# Patient Record
Sex: FEMALE | Race: BLACK OR AFRICAN AMERICAN | NOT HISPANIC OR LATINO
[De-identification: names, ages, dates, MRNs, and addresses within clinical notes are randomized per-mention and may not be internally consistent; named-entity substitution may affect disease eponyms.]

---

## 2022-11-11 ENCOUNTER — NON-APPOINTMENT (OUTPATIENT)
Age: 42
End: 2022-11-11

## 2022-11-11 ENCOUNTER — LABORATORY RESULT (OUTPATIENT)
Age: 42
End: 2022-11-11

## 2022-11-11 ENCOUNTER — APPOINTMENT (OUTPATIENT)
Dept: OBGYN | Facility: CLINIC | Age: 42
End: 2022-11-11

## 2022-11-11 VITALS
HEART RATE: 65 BPM | DIASTOLIC BLOOD PRESSURE: 81 MMHG | WEIGHT: 166 LBS | HEIGHT: 68 IN | OXYGEN SATURATION: 100 % | SYSTOLIC BLOOD PRESSURE: 122 MMHG | BODY MASS INDEX: 25.16 KG/M2

## 2022-11-11 DIAGNOSIS — Z34.90 ENCOUNTER FOR SUPERVISION OF NORMAL PREGNANCY, UNSPECIFIED, UNSPECIFIED TRIMESTER: ICD-10-CM

## 2022-11-11 DIAGNOSIS — Z86.018 PERSONAL HISTORY OF OTHER BENIGN NEOPLASM: ICD-10-CM

## 2022-11-11 DIAGNOSIS — O09.819 SUPERVISION OF PREGNANCY RESULTING FROM ASSISTED REPRODUCTIVE TECHNOLOGY, UNSPECIFIED TRIMESTER: ICD-10-CM

## 2022-11-11 DIAGNOSIS — D56.3 THALASSEMIA MINOR: ICD-10-CM

## 2022-11-11 PROBLEM — Z00.00 ENCOUNTER FOR PREVENTIVE HEALTH EXAMINATION: Status: ACTIVE | Noted: 2022-11-11

## 2022-11-11 PROCEDURE — 36415 COLL VENOUS BLD VENIPUNCTURE: CPT

## 2022-11-11 PROCEDURE — 76817 TRANSVAGINAL US OBSTETRIC: CPT

## 2022-11-11 PROCEDURE — 99203 OFFICE O/P NEW LOW 30 MIN: CPT | Mod: 25

## 2022-11-11 NOTE — PROCEDURE
[Cervical Pap Smear] : cervical Pap smear [Liquid Base] : liquid base [GC Chlamydia Culture] : GC Chlamydia Culture [Affirm (Triple Culture)] : Affirm (triple culture) [Tolerated Well] : the patient tolerated the procedure well [No Complications] : there were no complications [Transvaginal OB Sonogram] : Transvaginal OB Sonogram [Intrauterine Pregnancy] : intrauterine pregnancy [Yolk Sac] : yolk sac present [Fetal Heart] : fetal heart present [Current GA by Sonogram: ___ (wks)] : Current GA by Sonogram: [unfilled]Uwks [___ day(s)] : [unfilled] days [Transvaginal OB Sonogram WNL] : Transvaginal OB Sonogram WNL

## 2022-11-11 NOTE — PLAN
[FreeTextEntry1] : CRL c/w PARAG. Initial prenatal packet given and pt oriented to the practice. NT referral given. Continue estrogen/progesterone until 12 weeks. Start  mg at 12-16 weeks. Follow up in 4 weeks.\par

## 2022-11-11 NOTE — HISTORY OF PRESENT ILLNESS
[Normal Amount/Duration] :  normal amount and duration [Frequency: Q ___ days] : menstrual periods occur approximately every [unfilled] days [Currently Active] : currently active [Men] : men [No] : No [FreeTextEntry1] : 09/07/2022

## 2022-11-12 LAB
ABO + RH PNL BLD: NORMAL
ALBUMIN SERPL ELPH-MCNC: 4.4 G/DL
ALP BLD-CCNC: 44 U/L
ALT SERPL-CCNC: 8 U/L
ANION GAP SERPL CALC-SCNC: 15 MMOL/L
AST SERPL-CCNC: 14 U/L
BASOPHILS # BLD AUTO: 0.01 K/UL
BASOPHILS NFR BLD AUTO: 0.2 %
BILIRUB SERPL-MCNC: 0.4 MG/DL
BLD GP AB SCN SERPL QL: NORMAL
BUN SERPL-MCNC: 7 MG/DL
CALCIUM SERPL-MCNC: 9.4 MG/DL
CHLORIDE SERPL-SCNC: 104 MMOL/L
CMV IGG SERPL QL: 5 U/ML
CMV IGG SERPL-IMP: POSITIVE
CMV IGM SERPL QL: <8 AU/ML
CMV IGM SERPL QL: NEGATIVE
CO2 SERPL-SCNC: 16 MMOL/L
CREAT SERPL-MCNC: 0.68 MG/DL
EGFR: 114 ML/MIN/1.73M2
EOSINOPHIL # BLD AUTO: 0.17 K/UL
EOSINOPHIL NFR BLD AUTO: 3.6 %
GLUCOSE SERPL-MCNC: 93 MG/DL
HBV SURFACE AG SER QL: NONREACTIVE
HCT VFR BLD CALC: 38.2 %
HCV AB SER QL: NONREACTIVE
HCV S/CO RATIO: 0.09 S/CO
HGB BLD-MCNC: 12.4 G/DL
HIV1+2 AB SPEC QL IA.RAPID: NONREACTIVE
IMM GRANULOCYTES NFR BLD AUTO: 0.4 %
LDH SERPL-CCNC: 157 U/L
LYMPHOCYTES # BLD AUTO: 1.3 K/UL
LYMPHOCYTES NFR BLD AUTO: 27.6 %
MAN DIFF?: NORMAL
MCHC RBC-ENTMCNC: 27.9 PG
MCHC RBC-ENTMCNC: 32.5 GM/DL
MCV RBC AUTO: 85.8 FL
MONOCYTES # BLD AUTO: 0.36 K/UL
MONOCYTES NFR BLD AUTO: 7.6 %
MUV AB SER-ACNC: POSITIVE
MUV IGG SER QL IA: >300 AU/ML
NEUTROPHILS # BLD AUTO: 2.85 K/UL
NEUTROPHILS NFR BLD AUTO: 60.6 %
PLATELET # BLD AUTO: 203 K/UL
POTASSIUM SERPL-SCNC: 4.1 MMOL/L
PROT SERPL-MCNC: 7.2 G/DL
RBC # BLD: 4.45 M/UL
RBC # FLD: 15 %
RUBV IGG FLD-ACNC: 18.1 INDEX
RUBV IGG SER-IMP: POSITIVE
SODIUM SERPL-SCNC: 134 MMOL/L
T GONDII AB SER-IMP: NEGATIVE
T GONDII AB SER-IMP: POSITIVE
T GONDII IGG SER QL: 40.1 IU/ML
T GONDII IGM SER QL: <3 AU/ML
T PALLIDUM AB SER QL IA: NEGATIVE
TSH SERPL-ACNC: 0.21 UIU/ML
URATE SERPL-MCNC: 3.6 MG/DL
VZV AB TITR SER: POSITIVE
VZV IGG SER IF-ACNC: 499.7 INDEX
WBC # FLD AUTO: 4.71 K/UL

## 2022-11-14 DIAGNOSIS — O23.599 INFECTION OF OTHER PART OF GENITAL TRACT IN PREGNANCY, UNSPECIFIED TRIMESTER: ICD-10-CM

## 2022-11-14 DIAGNOSIS — B96.89 INFECTION OF OTHER PART OF GENITAL TRACT IN PREGNANCY, UNSPECIFIED TRIMESTER: ICD-10-CM

## 2022-11-14 LAB
C TRACH RRNA SPEC QL NAA+PROBE: NOT DETECTED
CANDIDA VAG CYTO: NOT DETECTED
G VAGINALIS+PREV SP MTYP VAG QL MICRO: DETECTED
HGB A MFR BLD: 96.9 %
HGB A2 MFR BLD: 2.5 %
HGB F MFR BLD: 0.6 %
HGB FRACT BLD-IMP: NORMAL
HPV HIGH+LOW RISK DNA PNL CVX: NOT DETECTED
N GONORRHOEA RRNA SPEC QL NAA+PROBE: NOT DETECTED
SOURCE AMPLIFICATION: NORMAL
T VAGINALIS VAG QL WET PREP: NOT DETECTED

## 2022-11-14 RX ORDER — METRONIDAZOLE 7.5 MG/G
0.75 GEL VAGINAL
Qty: 1 | Refills: 0 | Status: ACTIVE | COMMUNITY
Start: 2022-11-14 | End: 1900-01-01

## 2022-11-15 LAB
FMR1 GENE MUT ANL BLD/T: NORMAL
MEV IGM SER QL: <0.91 ISR

## 2022-11-16 ENCOUNTER — TRANSCRIPTION ENCOUNTER (OUTPATIENT)
Age: 42
End: 2022-11-16

## 2022-11-19 LAB
AR GENE MUT ANL BLD/T: NORMAL
B19V IGG SER QL IA: 0.84 INDEX
B19V IGG+IGM SER-IMP: NEGATIVE
B19V IGG+IGM SER-IMP: NORMAL
B19V IGM FLD-ACNC: 0.07 INDEX
B19V IGM SER-ACNC: NEGATIVE
CYTOLOGY CVX/VAG DOC THIN PREP: NORMAL

## 2022-11-20 LAB — CFTR MUT TESTED BLD/T: NEGATIVE

## 2022-12-07 ENCOUNTER — APPOINTMENT (OUTPATIENT)
Dept: ANTEPARTUM | Facility: CLINIC | Age: 42
End: 2022-12-07

## 2022-12-07 ENCOUNTER — ASOB RESULT (OUTPATIENT)
Age: 42
End: 2022-12-07

## 2022-12-07 PROCEDURE — 76813 OB US NUCHAL MEAS 1 GEST: CPT

## 2022-12-07 PROCEDURE — 93976 VASCULAR STUDY: CPT

## 2022-12-07 PROCEDURE — 36415 COLL VENOUS BLD VENIPUNCTURE: CPT

## 2022-12-09 ENCOUNTER — NON-APPOINTMENT (OUTPATIENT)
Age: 42
End: 2022-12-09

## 2022-12-09 ENCOUNTER — APPOINTMENT (OUTPATIENT)
Dept: OBGYN | Facility: CLINIC | Age: 42
End: 2022-12-09

## 2022-12-09 VITALS
WEIGHT: 165 LBS | BODY MASS INDEX: 25.09 KG/M2 | HEART RATE: 61 BPM | OXYGEN SATURATION: 99 % | DIASTOLIC BLOOD PRESSURE: 72 MMHG | SYSTOLIC BLOOD PRESSURE: 116 MMHG

## 2022-12-09 PROCEDURE — 76815 OB US LIMITED FETUS(S): CPT

## 2022-12-09 PROCEDURE — 0500F INITIAL PRENATAL CARE VISIT: CPT

## 2022-12-09 PROCEDURE — 36415 COLL VENOUS BLD VENIPUNCTURE: CPT

## 2022-12-30 ENCOUNTER — APPOINTMENT (OUTPATIENT)
Dept: ANTEPARTUM | Facility: CLINIC | Age: 42
End: 2022-12-30
Payer: COMMERCIAL

## 2022-12-30 ENCOUNTER — ASOB RESULT (OUTPATIENT)
Age: 42
End: 2022-12-30

## 2022-12-30 PROCEDURE — 76817 TRANSVAGINAL US OBSTETRIC: CPT | Mod: 59

## 2022-12-30 PROCEDURE — 76811 OB US DETAILED SNGL FETUS: CPT

## 2023-01-06 ENCOUNTER — NON-APPOINTMENT (OUTPATIENT)
Age: 43
End: 2023-01-06

## 2023-01-06 ENCOUNTER — APPOINTMENT (OUTPATIENT)
Dept: OBGYN | Facility: CLINIC | Age: 43
End: 2023-01-06

## 2023-02-06 ENCOUNTER — APPOINTMENT (OUTPATIENT)
Dept: ANTEPARTUM | Facility: CLINIC | Age: 43
End: 2023-02-06
Payer: COMMERCIAL

## 2023-02-06 ENCOUNTER — ASOB RESULT (OUTPATIENT)
Age: 43
End: 2023-02-06

## 2023-02-06 PROCEDURE — 76816 OB US FOLLOW-UP PER FETUS: CPT

## 2023-02-06 PROCEDURE — 76817 TRANSVAGINAL US OBSTETRIC: CPT | Mod: 59

## 2023-03-09 ENCOUNTER — NON-APPOINTMENT (OUTPATIENT)
Age: 43
End: 2023-03-09

## 2023-04-07 ENCOUNTER — ASOB RESULT (OUTPATIENT)
Age: 43
End: 2023-04-07

## 2023-04-07 ENCOUNTER — APPOINTMENT (OUTPATIENT)
Dept: ANTEPARTUM | Facility: CLINIC | Age: 43
End: 2023-04-07
Payer: COMMERCIAL

## 2023-04-07 PROCEDURE — 76816 OB US FOLLOW-UP PER FETUS: CPT

## 2023-04-07 PROCEDURE — 76819 FETAL BIOPHYS PROFIL W/O NST: CPT

## 2023-05-05 ENCOUNTER — ASOB RESULT (OUTPATIENT)
Age: 43
End: 2023-05-05

## 2023-05-05 ENCOUNTER — APPOINTMENT (OUTPATIENT)
Dept: ANTEPARTUM | Facility: CLINIC | Age: 43
End: 2023-05-05
Payer: COMMERCIAL

## 2023-05-05 PROCEDURE — 76819 FETAL BIOPHYS PROFIL W/O NST: CPT

## 2023-05-05 PROCEDURE — 76816 OB US FOLLOW-UP PER FETUS: CPT

## 2023-05-26 ENCOUNTER — APPOINTMENT (OUTPATIENT)
Dept: ANTEPARTUM | Facility: CLINIC | Age: 43
End: 2023-05-26
Payer: COMMERCIAL

## 2023-05-26 ENCOUNTER — ASOB RESULT (OUTPATIENT)
Age: 43
End: 2023-05-26

## 2023-05-26 PROCEDURE — 76816 OB US FOLLOW-UP PER FETUS: CPT

## 2023-05-26 PROCEDURE — 76819 FETAL BIOPHYS PROFIL W/O NST: CPT

## 2023-06-06 ENCOUNTER — TRANSCRIPTION ENCOUNTER (OUTPATIENT)
Age: 43
End: 2023-06-06

## 2023-06-07 ENCOUNTER — INPATIENT (INPATIENT)
Facility: HOSPITAL | Age: 43
LOS: 2 days | Discharge: HOME | End: 2023-06-10
Attending: OBSTETRICS & GYNECOLOGY | Admitting: OBSTETRICS & GYNECOLOGY
Payer: COMMERCIAL

## 2023-06-07 VITALS — HEIGHT: 69 IN | WEIGHT: 184.09 LBS

## 2023-06-07 DIAGNOSIS — D62 ACUTE POSTHEMORRHAGIC ANEMIA: ICD-10-CM

## 2023-06-07 LAB
BASOPHILS # BLD AUTO: 0.01 K/UL — SIGNIFICANT CHANGE UP (ref 0–0.2)
BASOPHILS NFR BLD AUTO: 0.2 % — SIGNIFICANT CHANGE UP (ref 0–2)
BLD GP AB SCN SERPL QL: NEGATIVE — SIGNIFICANT CHANGE UP
EOSINOPHIL # BLD AUTO: 0.03 K/UL — SIGNIFICANT CHANGE UP (ref 0–0.5)
EOSINOPHIL NFR BLD AUTO: 0.5 % — SIGNIFICANT CHANGE UP (ref 0–6)
HCT VFR BLD CALC: 41.1 % — SIGNIFICANT CHANGE UP (ref 34.5–45)
HGB BLD-MCNC: 13.3 G/DL — SIGNIFICANT CHANGE UP (ref 11.5–15.5)
IMM GRANULOCYTES NFR BLD AUTO: 0.9 % — SIGNIFICANT CHANGE UP (ref 0–0.9)
LYMPHOCYTES # BLD AUTO: 1.46 K/UL — SIGNIFICANT CHANGE UP (ref 1–3.3)
LYMPHOCYTES # BLD AUTO: 26.1 % — SIGNIFICANT CHANGE UP (ref 13–44)
MCHC RBC-ENTMCNC: 27.4 PG — SIGNIFICANT CHANGE UP (ref 27–34)
MCHC RBC-ENTMCNC: 32.4 GM/DL — SIGNIFICANT CHANGE UP (ref 32–36)
MCV RBC AUTO: 84.6 FL — SIGNIFICANT CHANGE UP (ref 80–100)
MONOCYTES # BLD AUTO: 0.45 K/UL — SIGNIFICANT CHANGE UP (ref 0–0.9)
MONOCYTES NFR BLD AUTO: 8 % — SIGNIFICANT CHANGE UP (ref 2–14)
NEUTROPHILS # BLD AUTO: 3.6 K/UL — SIGNIFICANT CHANGE UP (ref 1.8–7.4)
NEUTROPHILS NFR BLD AUTO: 64.3 % — SIGNIFICANT CHANGE UP (ref 43–77)
NRBC # BLD: 0 /100 WBCS — SIGNIFICANT CHANGE UP (ref 0–0)
PLATELET # BLD AUTO: 174 K/UL — SIGNIFICANT CHANGE UP (ref 150–400)
RBC # BLD: 4.86 M/UL — SIGNIFICANT CHANGE UP (ref 3.8–5.2)
RBC # FLD: 16.5 % — HIGH (ref 10.3–14.5)
RH IG SCN BLD-IMP: POSITIVE — SIGNIFICANT CHANGE UP
WBC # BLD: 5.6 K/UL — SIGNIFICANT CHANGE UP (ref 3.8–10.5)
WBC # FLD AUTO: 5.6 K/UL — SIGNIFICANT CHANGE UP (ref 3.8–10.5)

## 2023-06-07 PROCEDURE — 88307 TISSUE EXAM BY PATHOLOGIST: CPT | Mod: 26

## 2023-06-07 DEVICE — INTERCEED 3 X 4": Type: IMPLANTABLE DEVICE | Status: FUNCTIONAL

## 2023-06-07 DEVICE — ARISTA 3GR: Type: IMPLANTABLE DEVICE | Status: FUNCTIONAL

## 2023-06-07 RX ORDER — NALOXONE HYDROCHLORIDE 4 MG/.1ML
0.1 SPRAY NASAL
Refills: 0 | Status: DISCONTINUED | OUTPATIENT
Start: 2023-06-07 | End: 2023-06-10

## 2023-06-07 RX ORDER — OXYTOCIN 10 UNIT/ML
333.33 VIAL (ML) INJECTION
Qty: 20 | Refills: 0 | Status: DISCONTINUED | OUTPATIENT
Start: 2023-06-07 | End: 2023-06-10

## 2023-06-07 RX ORDER — ONDANSETRON 8 MG/1
4 TABLET, FILM COATED ORAL EVERY 6 HOURS
Refills: 0 | Status: DISCONTINUED | OUTPATIENT
Start: 2023-06-07 | End: 2023-06-10

## 2023-06-07 RX ORDER — KETOROLAC TROMETHAMINE 30 MG/ML
30 SYRINGE (ML) INJECTION EVERY 6 HOURS
Refills: 0 | Status: DISCONTINUED | OUTPATIENT
Start: 2023-06-07 | End: 2023-06-08

## 2023-06-07 RX ORDER — CITRIC ACID/SODIUM CITRATE 300-500 MG
30 SOLUTION, ORAL ORAL ONCE
Refills: 0 | Status: COMPLETED | OUTPATIENT
Start: 2023-06-07 | End: 2023-06-07

## 2023-06-07 RX ORDER — BUPIVACAINE 13.3 MG/ML
20 INJECTION, SUSPENSION, LIPOSOMAL INFILTRATION ONCE
Refills: 0 | Status: COMPLETED | OUTPATIENT
Start: 2023-06-07 | End: 2023-06-07

## 2023-06-07 RX ORDER — OXYTOCIN 10 UNIT/ML
333.33 VIAL (ML) INJECTION
Qty: 20 | Refills: 0 | Status: DISCONTINUED | OUTPATIENT
Start: 2023-06-07 | End: 2023-06-07

## 2023-06-07 RX ORDER — IBUPROFEN 200 MG
600 TABLET ORAL EVERY 6 HOURS
Refills: 0 | Status: COMPLETED | OUTPATIENT
Start: 2023-06-07 | End: 2024-05-05

## 2023-06-07 RX ORDER — TETANUS TOXOID, REDUCED DIPHTHERIA TOXOID AND ACELLULAR PERTUSSIS VACCINE, ADSORBED 5; 2.5; 8; 8; 2.5 [IU]/.5ML; [IU]/.5ML; UG/.5ML; UG/.5ML; UG/.5ML
0.5 SUSPENSION INTRAMUSCULAR ONCE
Refills: 0 | Status: DISCONTINUED | OUTPATIENT
Start: 2023-06-07 | End: 2023-06-10

## 2023-06-07 RX ORDER — MAGNESIUM HYDROXIDE 400 MG/1
30 TABLET, CHEWABLE ORAL
Refills: 0 | Status: DISCONTINUED | OUTPATIENT
Start: 2023-06-07 | End: 2023-06-10

## 2023-06-07 RX ORDER — SODIUM CHLORIDE 9 MG/ML
1000 INJECTION, SOLUTION INTRAVENOUS ONCE
Refills: 0 | Status: COMPLETED | OUTPATIENT
Start: 2023-06-07 | End: 2023-06-07

## 2023-06-07 RX ORDER — SODIUM CHLORIDE 9 MG/ML
1000 INJECTION, SOLUTION INTRAVENOUS
Refills: 0 | Status: DISCONTINUED | OUTPATIENT
Start: 2023-06-07 | End: 2023-06-07

## 2023-06-07 RX ORDER — ENOXAPARIN SODIUM 100 MG/ML
40 INJECTION SUBCUTANEOUS EVERY 24 HOURS
Refills: 0 | Status: DISCONTINUED | OUTPATIENT
Start: 2023-06-08 | End: 2023-06-10

## 2023-06-07 RX ORDER — LANOLIN
1 OINTMENT (GRAM) TOPICAL EVERY 6 HOURS
Refills: 0 | Status: DISCONTINUED | OUTPATIENT
Start: 2023-06-07 | End: 2023-06-10

## 2023-06-07 RX ORDER — FAMOTIDINE 10 MG/ML
20 INJECTION INTRAVENOUS ONCE
Refills: 0 | Status: COMPLETED | OUTPATIENT
Start: 2023-06-07 | End: 2023-06-07

## 2023-06-07 RX ORDER — SIMETHICONE 80 MG/1
80 TABLET, CHEWABLE ORAL EVERY 4 HOURS
Refills: 0 | Status: DISCONTINUED | OUTPATIENT
Start: 2023-06-07 | End: 2023-06-10

## 2023-06-07 RX ORDER — OXYCODONE HYDROCHLORIDE 5 MG/1
5 TABLET ORAL ONCE
Refills: 0 | Status: DISCONTINUED | OUTPATIENT
Start: 2023-06-07 | End: 2023-06-10

## 2023-06-07 RX ORDER — CEFAZOLIN SODIUM 1 G
2000 VIAL (EA) INJECTION ONCE
Refills: 0 | Status: COMPLETED | OUTPATIENT
Start: 2023-06-07 | End: 2023-06-07

## 2023-06-07 RX ORDER — ACETAMINOPHEN 500 MG
1000 TABLET ORAL ONCE
Refills: 0 | Status: COMPLETED | OUTPATIENT
Start: 2023-06-07 | End: 2023-06-07

## 2023-06-07 RX ORDER — ACETAMINOPHEN 500 MG
975 TABLET ORAL
Refills: 0 | Status: DISCONTINUED | OUTPATIENT
Start: 2023-06-07 | End: 2023-06-10

## 2023-06-07 RX ORDER — OXYCODONE HYDROCHLORIDE 5 MG/1
5 TABLET ORAL
Refills: 0 | Status: COMPLETED | OUTPATIENT
Start: 2023-06-07 | End: 2023-06-14

## 2023-06-07 RX ORDER — SODIUM CHLORIDE 9 MG/ML
1000 INJECTION, SOLUTION INTRAVENOUS
Refills: 0 | Status: DISCONTINUED | OUTPATIENT
Start: 2023-06-07 | End: 2023-06-10

## 2023-06-07 RX ORDER — TRIAMCINOLONE 4 MG
40 TABLET ORAL ONCE
Refills: 0 | Status: COMPLETED | OUTPATIENT
Start: 2023-06-07 | End: 2023-06-07

## 2023-06-07 RX ORDER — DIPHENHYDRAMINE HCL 50 MG
25 CAPSULE ORAL EVERY 6 HOURS
Refills: 0 | Status: DISCONTINUED | OUTPATIENT
Start: 2023-06-07 | End: 2023-06-10

## 2023-06-07 RX ADMIN — Medication 1000 MILLIGRAM(S): at 12:50

## 2023-06-07 RX ADMIN — Medication 30 MILLIGRAM(S): at 13:51

## 2023-06-07 RX ADMIN — BUPIVACAINE 20 MILLILITER(S): 13.3 INJECTION, SUSPENSION, LIPOSOMAL INFILTRATION at 11:05

## 2023-06-07 RX ADMIN — Medication 975 MILLIGRAM(S): at 18:55

## 2023-06-07 RX ADMIN — SODIUM CHLORIDE 2000 MILLILITER(S): 9 INJECTION, SOLUTION INTRAVENOUS at 07:25

## 2023-06-07 RX ADMIN — Medication 40 MILLIGRAM(S): at 11:30

## 2023-06-07 RX ADMIN — Medication 30 MILLIGRAM(S): at 14:23

## 2023-06-07 RX ADMIN — Medication 1 APPLICATION(S): at 18:55

## 2023-06-07 RX ADMIN — Medication 100 MILLIGRAM(S): at 09:45

## 2023-06-07 RX ADMIN — Medication 975 MILLIGRAM(S): at 19:25

## 2023-06-07 RX ADMIN — Medication 30 MILLILITER(S): at 09:29

## 2023-06-07 RX ADMIN — Medication 1000 MILLIUNIT(S)/MIN: at 12:17

## 2023-06-07 RX ADMIN — FAMOTIDINE 20 MILLIGRAM(S): 10 INJECTION INTRAVENOUS at 08:00

## 2023-06-07 RX ADMIN — Medication 30 MILLIGRAM(S): at 21:04

## 2023-06-07 RX ADMIN — Medication 400 MILLIGRAM(S): at 12:31

## 2023-06-07 NOTE — PRE-ANESTHESIA EVALUATION ADULT - BMI (KG/M2)
Genevive Larger, your thyroid levels are normal. We will plan to repeat levels in 6 months, an order has been placed. You are due for a thyroid ultrasound and follow up. Please call the office to schedule a follow up appointment. 27.2

## 2023-06-08 LAB
BASOPHILS # BLD AUTO: 0.01 K/UL — SIGNIFICANT CHANGE UP (ref 0–0.2)
BASOPHILS NFR BLD AUTO: 0.1 % — SIGNIFICANT CHANGE UP (ref 0–2)
EOSINOPHIL # BLD AUTO: 0 K/UL — SIGNIFICANT CHANGE UP (ref 0–0.5)
EOSINOPHIL NFR BLD AUTO: 0 % — SIGNIFICANT CHANGE UP (ref 0–6)
HCT VFR BLD CALC: 30.1 % — LOW (ref 34.5–45)
HGB BLD-MCNC: 9.7 G/DL — LOW (ref 11.5–15.5)
IMM GRANULOCYTES NFR BLD AUTO: 0.6 % — SIGNIFICANT CHANGE UP (ref 0–0.9)
LYMPHOCYTES # BLD AUTO: 1.4 K/UL — SIGNIFICANT CHANGE UP (ref 1–3.3)
LYMPHOCYTES # BLD AUTO: 15 % — SIGNIFICANT CHANGE UP (ref 13–44)
MCHC RBC-ENTMCNC: 27.4 PG — SIGNIFICANT CHANGE UP (ref 27–34)
MCHC RBC-ENTMCNC: 32.2 GM/DL — SIGNIFICANT CHANGE UP (ref 32–36)
MCV RBC AUTO: 85 FL — SIGNIFICANT CHANGE UP (ref 80–100)
MONOCYTES # BLD AUTO: 0.79 K/UL — SIGNIFICANT CHANGE UP (ref 0–0.9)
MONOCYTES NFR BLD AUTO: 8.5 % — SIGNIFICANT CHANGE UP (ref 2–14)
NEUTROPHILS # BLD AUTO: 7.05 K/UL — SIGNIFICANT CHANGE UP (ref 1.8–7.4)
NEUTROPHILS NFR BLD AUTO: 75.8 % — SIGNIFICANT CHANGE UP (ref 43–77)
NRBC # BLD: 0 /100 WBCS — SIGNIFICANT CHANGE UP (ref 0–0)
PLATELET # BLD AUTO: 152 K/UL — SIGNIFICANT CHANGE UP (ref 150–400)
RBC # BLD: 3.54 M/UL — LOW (ref 3.8–5.2)
RBC # FLD: 16 % — HIGH (ref 10.3–14.5)
T PALLIDUM AB TITR SER: NEGATIVE — SIGNIFICANT CHANGE UP
WBC # BLD: 9.31 K/UL — SIGNIFICANT CHANGE UP (ref 3.8–10.5)
WBC # FLD AUTO: 9.31 K/UL — SIGNIFICANT CHANGE UP (ref 3.8–10.5)

## 2023-06-08 PROCEDURE — 59514 CESAREAN DELIVERY ONLY: CPT | Mod: AS

## 2023-06-08 RX ORDER — IBUPROFEN 200 MG
600 TABLET ORAL EVERY 6 HOURS
Refills: 0 | Status: DISCONTINUED | OUTPATIENT
Start: 2023-06-08 | End: 2023-06-10

## 2023-06-08 RX ORDER — OXYCODONE HYDROCHLORIDE 5 MG/1
5 TABLET ORAL
Refills: 0 | Status: DISCONTINUED | OUTPATIENT
Start: 2023-06-08 | End: 2023-06-10

## 2023-06-08 RX ORDER — OXYCODONE HYDROCHLORIDE 5 MG/1
1 TABLET ORAL
Qty: 12 | Refills: 0
Start: 2023-06-08

## 2023-06-08 RX ADMIN — Medication 600 MILLIGRAM(S): at 22:15

## 2023-06-08 RX ADMIN — SIMETHICONE 80 MILLIGRAM(S): 80 TABLET, CHEWABLE ORAL at 08:38

## 2023-06-08 RX ADMIN — Medication 600 MILLIGRAM(S): at 21:27

## 2023-06-08 RX ADMIN — OXYCODONE HYDROCHLORIDE 5 MILLIGRAM(S): 5 TABLET ORAL at 20:11

## 2023-06-08 RX ADMIN — SIMETHICONE 80 MILLIGRAM(S): 80 TABLET, CHEWABLE ORAL at 18:55

## 2023-06-08 RX ADMIN — Medication 600 MILLIGRAM(S): at 14:58

## 2023-06-08 RX ADMIN — Medication 30 MILLIGRAM(S): at 08:37

## 2023-06-08 RX ADMIN — Medication 975 MILLIGRAM(S): at 00:08

## 2023-06-08 RX ADMIN — Medication 30 MILLIGRAM(S): at 02:07

## 2023-06-08 RX ADMIN — ENOXAPARIN SODIUM 40 MILLIGRAM(S): 100 INJECTION SUBCUTANEOUS at 09:19

## 2023-06-08 RX ADMIN — Medication 975 MILLIGRAM(S): at 17:02

## 2023-06-08 RX ADMIN — OXYCODONE HYDROCHLORIDE 5 MILLIGRAM(S): 5 TABLET ORAL at 19:33

## 2023-06-08 RX ADMIN — Medication 975 MILLIGRAM(S): at 12:15

## 2023-06-08 RX ADMIN — Medication 975 MILLIGRAM(S): at 05:43

## 2023-06-08 NOTE — PROVIDER CONTACT NOTE (OTHER) - ACTION/TREATMENT ORDERED:
MD stated no further interventions are indicated at this time, just to notify her if pt passes another clot. Care ongoing. Pt verbalized understanding to notify RN if another blood clot is passed.

## 2023-06-08 NOTE — PROVIDER CONTACT NOTE (OTHER) - ASSESSMENT
Pt asymptomatic. fundus firm, no trickling. Pt asymptomatic. fundus firm, no trickling. Pt ambulating freely.

## 2023-06-08 NOTE — PROVIDER CONTACT NOTE (OTHER) - BACKGROUND
42 yr old female, Post op day 1. Delivered via c/s  1030,  @ 39. EBL 1000. Removed sanchez catheter  @ 4355.

## 2023-06-09 RX ORDER — OXYCODONE HYDROCHLORIDE 5 MG/1
1 TABLET ORAL
Qty: 12 | Refills: 0
Start: 2023-06-09

## 2023-06-09 RX ORDER — IRON SUCROSE 20 MG/ML
200 INJECTION, SOLUTION INTRAVENOUS ONCE
Refills: 0 | Status: COMPLETED | OUTPATIENT
Start: 2023-06-09 | End: 2023-06-09

## 2023-06-09 RX ORDER — ACETAMINOPHEN 500 MG
2 TABLET ORAL
Qty: 60 | Refills: 0
Start: 2023-06-09

## 2023-06-09 RX ORDER — IBUPROFEN 200 MG
1 TABLET ORAL
Qty: 60 | Refills: 0
Start: 2023-06-09

## 2023-06-09 RX ADMIN — Medication 975 MILLIGRAM(S): at 08:33

## 2023-06-09 RX ADMIN — ENOXAPARIN SODIUM 40 MILLIGRAM(S): 100 INJECTION SUBCUTANEOUS at 11:18

## 2023-06-09 RX ADMIN — Medication 975 MILLIGRAM(S): at 00:57

## 2023-06-09 RX ADMIN — IRON SUCROSE 110 MILLIGRAM(S): 20 INJECTION, SOLUTION INTRAVENOUS at 11:19

## 2023-06-09 RX ADMIN — Medication 600 MILLIGRAM(S): at 06:30

## 2023-06-09 RX ADMIN — Medication 600 MILLIGRAM(S): at 11:18

## 2023-06-09 RX ADMIN — Medication 975 MILLIGRAM(S): at 14:08

## 2023-06-09 RX ADMIN — SIMETHICONE 80 MILLIGRAM(S): 80 TABLET, CHEWABLE ORAL at 05:44

## 2023-06-09 RX ADMIN — Medication 600 MILLIGRAM(S): at 05:45

## 2023-06-09 RX ADMIN — Medication 975 MILLIGRAM(S): at 01:45

## 2023-06-09 RX ADMIN — SIMETHICONE 80 MILLIGRAM(S): 80 TABLET, CHEWABLE ORAL at 14:09

## 2023-06-09 RX ADMIN — Medication 975 MILLIGRAM(S): at 21:33

## 2023-06-09 RX ADMIN — Medication 600 MILLIGRAM(S): at 17:13

## 2023-06-10 ENCOUNTER — TRANSCRIPTION ENCOUNTER (OUTPATIENT)
Age: 43
End: 2023-06-10

## 2023-06-10 VITALS
HEART RATE: 67 BPM | DIASTOLIC BLOOD PRESSURE: 77 MMHG | TEMPERATURE: 98 F | OXYGEN SATURATION: 99 % | SYSTOLIC BLOOD PRESSURE: 119 MMHG | RESPIRATION RATE: 17 BRPM

## 2023-06-10 PROCEDURE — 86900 BLOOD TYPING SEROLOGIC ABO: CPT

## 2023-06-10 PROCEDURE — 86850 RBC ANTIBODY SCREEN: CPT

## 2023-06-10 PROCEDURE — 59050 FETAL MONITOR W/REPORT: CPT

## 2023-06-10 PROCEDURE — 86901 BLOOD TYPING SEROLOGIC RH(D): CPT

## 2023-06-10 PROCEDURE — 88307 TISSUE EXAM BY PATHOLOGIST: CPT

## 2023-06-10 PROCEDURE — 36415 COLL VENOUS BLD VENIPUNCTURE: CPT

## 2023-06-10 PROCEDURE — 86780 TREPONEMA PALLIDUM: CPT

## 2023-06-10 PROCEDURE — C1889: CPT

## 2023-06-10 PROCEDURE — C1765: CPT

## 2023-06-10 PROCEDURE — 85025 COMPLETE CBC W/AUTO DIFF WBC: CPT

## 2023-06-10 RX ORDER — OXYCODONE HYDROCHLORIDE 5 MG/1
1 TABLET ORAL
Qty: 5 | Refills: 0
Start: 2023-06-10

## 2023-06-10 RX ORDER — ACETAMINOPHEN 500 MG
3 TABLET ORAL
Qty: 0 | Refills: 0 | DISCHARGE
Start: 2023-06-10

## 2023-06-10 RX ORDER — IBUPROFEN 200 MG
1 TABLET ORAL
Qty: 0 | Refills: 0 | DISCHARGE
Start: 2023-06-10

## 2023-06-10 RX ADMIN — Medication 975 MILLIGRAM(S): at 03:04

## 2023-06-10 RX ADMIN — Medication 600 MILLIGRAM(S): at 06:35

## 2023-06-10 RX ADMIN — Medication 975 MILLIGRAM(S): at 08:55

## 2023-06-10 RX ADMIN — Medication 600 MILLIGRAM(S): at 00:18

## 2023-06-10 RX ADMIN — Medication 600 MILLIGRAM(S): at 11:23

## 2023-06-10 NOTE — DISCHARGE NOTE OB - PATIENT PORTAL LINK FT
C/o pain and swelling to left arm. Denies injury. Bruising to left forearm and upper arm. +cms.
You can access the FollowMyHealth Patient Portal offered by [facility] by registering at the following website: [email or web address]. By joining [facility]’s FollowMyHealth portal, you will also be able to view your health information using other applications (apps) compatible with our system.

## 2023-06-10 NOTE — PROGRESS NOTE ADULT - SUBJECTIVE AND OBJECTIVE BOX
Patient evaluated at bedside this morning on POD#1, resting comfortable in bed with no acute events overnight.  She reports pain is well controlled with tylenol and toradol.  She denies headache, dizziness, chest pain, palpitations, shortness of breath, nausea, vomiting, or heavy vaginal bleeding.  She has ambulating, tolerating regular diet, no flatus, sanchez removed, but not yet voided.     Physical Exam:  Vital Signs Last 24 Hrs  T(C): 36.9 (08 Jun 2023 06:10), Max: 37 (07 Jun 2023 22:00)  T(F): 98.5 (08 Jun 2023 06:10), Max: 98.6 (07 Jun 2023 22:00)  HR: 66 (08 Jun 2023 06:10) (62 - 84)  BP: 125/70 (08 Jun 2023 06:10) (98/64 - 135/79)  BP(mean): 92 (07 Jun 2023 13:41) (76 - 101)  RR: 18 (08 Jun 2023 06:10) (15 - 18)  SpO2: 97% (08 Jun 2023 06:10) (97% - 100%)    Parameters below as of 07 Jun 2023 15:15  Patient On (Oxygen Delivery Method): room air        GA: comfortable in NAD  Abd: soft, nontender, nondistended, no rebound or guarding, incision clean, dry and intact, uterus firm at midline, 2fb below umbilicus  : lochia WNL  Extremities: no swelling or calf tenderness, SCDs in place                            13.3   5.60  )-----------( 174      ( 07 Jun 2023 07:31 )             41.1               acetaminophen     Tablet .. 975 milliGRAM(s) Oral <User Schedule>  diphenhydrAMINE 25 milliGRAM(s) Oral every 6 hours PRN  diphtheria/tetanus/pertussis (acellular) Vaccine (Adacel) 0.5 milliLiter(s) IntraMuscular once  enoxaparin Injectable 40 milliGRAM(s) SubCutaneous every 24 hours  ibuprofen  Tablet. 600 milliGRAM(s) Oral every 6 hours  ketorolac   Injectable 30 milliGRAM(s) IV Push every 6 hours  lactated ringers. 1000 milliLiter(s) IV Continuous <Continuous>  lanolin Ointment 1 Application(s) Topical every 6 hours PRN  magnesium hydroxide Suspension 30 milliLiter(s) Oral two times a day PRN  naloxone Injectable 0.1 milliGRAM(s) IV Push every 3 minutes PRN  ondansetron Injectable 4 milliGRAM(s) IV Push every 6 hours PRN  oxyCODONE    IR 5 milliGRAM(s) Oral once PRN  oxyCODONE    IR 5 milliGRAM(s) Oral every 3 hours PRN  oxytocin Infusion 333.333 milliUNIT(s)/Min IV Continuous <Continuous>  simethicone 80 milliGRAM(s) Chew every 4 hours PRN  
Patient evaluated at bedside.   She reports pain is well controlled with OPM.  She has been ambulating without assistance, voiding spontaneously, passing gas, tolerating regular diet and is breastfeeding.  She denies HA, dizziness, CP, palpitations, SOB, n/v, or heavy vaginal bleeding.    Physical Exam:  Vital Signs Last 24 Hrs  T(C): 36.9 (09 Jun 2023 22:00), Max: 37.1 (09 Jun 2023 18:11)  T(F): 98.4 (09 Jun 2023 22:00), Max: 98.8 (09 Jun 2023 18:11)  HR: 75 (09 Jun 2023 22:00) (66 - 75)  BP: 112/79 (09 Jun 2023 22:00) (112/79 - 130/82)  BP(mean): --  RR: 18 (09 Jun 2023 22:00) (17 - 18)  SpO2: 97% (09 Jun 2023 22:00) (97% - 98%)    Parameters below as of 09 Jun 2023 10:01  Patient On (Oxygen Delivery Method): room air        Gen: NAD  Abd: + BS, soft, nontender, nondistended, no rebound or guarding  Incision clean, dry and intact  uterus firm at midline below the umbilicus  : lochia WNL  Extremities: no calf tenderness                          9.7    9.31  )-----------( 152      ( 08 Jun 2023 10:53 )             30.1               
Patient evaluated at bedside this morning on POD#2, resting comfortable in bed with no acute events overnight.  She reports pain is well controlled with tylenol, ibuprofen, and oxycodone.  She denies headache, dizziness, chest pain, palpitations, shortness of breath, nausea, vomiting, or heavy vaginal bleeding.  She has been ambulating, tolerating regular diet, passing flatus, and voiding.     Physical Exam:  Vital Signs Last 24 Hrs  T(C): 36.8 (09 Jun 2023 06:07), Max: 36.9 (08 Jun 2023 22:00)  T(F): 98.2 (09 Jun 2023 06:07), Max: 98.4 (08 Jun 2023 22:00)  HR: 65 (09 Jun 2023 06:07) (65 - 72)  BP: 136/86 (09 Jun 2023 06:07) (114/68 - 136/86)  BP(mean): 89 (08 Jun 2023 18:00) (89 - 89)  RR: 18 (09 Jun 2023 06:07) (17 - 18)  SpO2: 97% (09 Jun 2023 06:07) (97% - 99%)    Parameters below as of 09 Jun 2023 06:07  Patient On (Oxygen Delivery Method): room air        GA: comfortable in NAD  Abd: soft, nontender, nondistended, no rebound or guarding, incision clean, dry and intact, uterus firm at midline, 2 fb below umbilicus  : lochia WNL  Extremities: no swelling or calf tenderness, SCDs in place                            9.7    9.31  )-----------( 152      ( 08 Jun 2023 10:53 )             30.1               acetaminophen     Tablet .. 975 milliGRAM(s) Oral <User Schedule>  diphenhydrAMINE 25 milliGRAM(s) Oral every 6 hours PRN  diphtheria/tetanus/pertussis (acellular) Vaccine (Adacel) 0.5 milliLiter(s) IntraMuscular once  enoxaparin Injectable 40 milliGRAM(s) SubCutaneous every 24 hours  ibuprofen  Tablet. 600 milliGRAM(s) Oral every 6 hours  lactated ringers. 1000 milliLiter(s) IV Continuous <Continuous>  lanolin Ointment 1 Application(s) Topical every 6 hours PRN  magnesium hydroxide Suspension 30 milliLiter(s) Oral two times a day PRN  naloxone Injectable 0.1 milliGRAM(s) IV Push every 3 minutes PRN  ondansetron Injectable 4 milliGRAM(s) IV Push every 6 hours PRN  oxyCODONE    IR 5 milliGRAM(s) Oral once PRN  oxyCODONE    IR 5 milliGRAM(s) Oral every 3 hours PRN  oxytocin Infusion 333.333 milliUNIT(s)/Min IV Continuous <Continuous>  simethicone 80 milliGRAM(s) Chew every 4 hours PRN

## 2023-06-10 NOTE — DISCHARGE NOTE OB - CARE PLAN
1 Principal Discharge DX:	Postpartum state  Assessment and plan of treatment:	No heavy lifting. Pelvic rest until cleared. Follow-up with obstetrician in 1-2 weeks. Take Motrin 600mg every 6 hours and/or tylenol 650mg every 6 hours as needed for pain. Nothing per vagina until cleared by your OB - no intercourse, douching, tampons, etc.  Call your OB if you experience severe abdominal pain not improved by oral pain medications, heavy bright red vaginal bleeding saturating more than 1 pad per hour, foul smelling discharge from the incision site, or fever greater than 100.4F. Consider contraception options to be discussed with your OB.

## 2023-06-10 NOTE — PROGRESS NOTE ADULT - ASSESSMENT
42y Female POD#3 s/p C/S, Uncomplicated                                       1. Neuro/Pain:  OPM  2  CV:  VS per routine  3. Pulm: Encourage ISS & Ambulation  4. GI:  Reg  5. : Voiding  6. DVT ppx: SCDs,lovenox 40mg qd  7. Dispo: POD #3 or #4
A/P: 42y  s/p  section, POD #1, stable  -  Pain: motrin/acetaminophen, oxycodone for severe pain PRN  -  Post-operatively labs: post-op Hgb pending, no signs and symptoms of anemia  -  GI: tolerating regular diet, no flatus  -  : s/p sanchez, follow up TOV  -  DVT prophylaxis: ambulation, SCDs, lovenox  -  Dispo: POD 3 or 4
A/P: 42y s/p  section, POD #2 stable  -  Pain: PO motrin/acetaminophen, oxycodone for severe pain PRN  -  Post-operatively labs: post-op Hgb 9.7 on POD1, no signs and symptoms of anemia, will consider IV iron pending Dr. Stephenson recommendation  -  GI: tolerating regular diet, passing gas  -  : voiding spontaneously  -  DVT prophylaxis: ambulation, SCDs, Lovenox  -  Dispo: POD 3 or 4

## 2023-06-10 NOTE — DISCHARGE NOTE OB - HOSPITAL COURSE
42y female s/p  section, post operative day 3, uncomplicated, meeting all postpartum milestones. Vitals stable for discharge.

## 2023-06-10 NOTE — DISCHARGE NOTE OB - CARE PROVIDER_API CALL
Yes
Jeffery Stephenson  Obstetrics and Gynecology  [address] [place] [address]  Phone: [phone]  Fax: [phone]  Follow Up Time:

## 2023-06-10 NOTE — DISCHARGE NOTE OB - MEDICATION SUMMARY - MEDICATIONS TO TAKE
I will START or STAY ON the medications listed below when I get home from the hospital:    ibuprofen 600 mg oral tablet  -- 1 tab(s) by mouth every 6 hours  -- Indication: For pain    acetaminophen 325 mg oral tablet  -- 3 tab(s) by mouth every 6 hours  -- Indication: For pain    Prenatal 1 oral capsule  -- orally  -- Indication: For postaprtum

## 2023-06-10 NOTE — DISCHARGE NOTE OB - PLAN OF CARE
No heavy lifting. Pelvic rest until cleared. Follow-up with obstetrician in 1-2 weeks. Take Motrin 600mg every 6 hours and/or tylenol 650mg every 6 hours as needed for pain. Nothing per vagina until cleared by your OB - no intercourse, douching, tampons, etc.  Call your OB if you experience severe abdominal pain not improved by oral pain medications, heavy bright red vaginal bleeding saturating more than 1 pad per hour, foul smelling discharge from the incision site, or fever greater than 100.4F. Consider contraception options to be discussed with your OB.

## 2023-06-14 DIAGNOSIS — Z3A.39 39 WEEKS GESTATION OF PREGNANCY: ICD-10-CM

## 2023-06-14 DIAGNOSIS — O34.29 MATERNAL CARE DUE TO UTERINE SCAR FROM OTHER PREVIOUS SURGERY: ICD-10-CM

## 2023-06-20 LAB — SURGICAL PATHOLOGY STUDY: SIGNIFICANT CHANGE UP

## 2023-06-21 ENCOUNTER — INPATIENT (INPATIENT)
Facility: HOSPITAL | Age: 43
LOS: 1 days | Discharge: HOME | DRG: 776 | End: 2023-06-23
Attending: OBSTETRICS & GYNECOLOGY | Admitting: OBSTETRICS & GYNECOLOGY
Payer: COMMERCIAL

## 2023-06-21 VITALS
HEIGHT: 69 IN | RESPIRATION RATE: 16 BRPM | WEIGHT: 164.91 LBS | HEART RATE: 51 BPM | OXYGEN SATURATION: 100 % | SYSTOLIC BLOOD PRESSURE: 183 MMHG | DIASTOLIC BLOOD PRESSURE: 87 MMHG | TEMPERATURE: 99 F

## 2023-06-21 DIAGNOSIS — Z98.891 HISTORY OF UTERINE SCAR FROM PREVIOUS SURGERY: Chronic | ICD-10-CM

## 2023-06-21 DIAGNOSIS — Z98.890 OTHER SPECIFIED POSTPROCEDURAL STATES: Chronic | ICD-10-CM

## 2023-06-21 LAB
ALBUMIN SERPL ELPH-MCNC: 3.9 G/DL — SIGNIFICANT CHANGE UP (ref 3.3–5)
ALP SERPL-CCNC: SIGNIFICANT CHANGE UP (ref 40–120)
ALT FLD-CCNC: SIGNIFICANT CHANGE UP (ref 10–45)
ANION GAP SERPL CALC-SCNC: 14 MMOL/L — SIGNIFICANT CHANGE UP (ref 5–17)
APPEARANCE UR: ABNORMAL
APTT BLD: 35.3 SEC — SIGNIFICANT CHANGE UP (ref 27.5–35.5)
AST SERPL-CCNC: SIGNIFICANT CHANGE UP (ref 10–40)
BACTERIA # UR AUTO: PRESENT /HPF
BASOPHILS # BLD AUTO: 0.03 K/UL — SIGNIFICANT CHANGE UP (ref 0–0.2)
BASOPHILS NFR BLD AUTO: 0.5 % — SIGNIFICANT CHANGE UP (ref 0–2)
BILIRUB SERPL-MCNC: 0.8 MG/DL — SIGNIFICANT CHANGE UP (ref 0.2–1.2)
BILIRUB UR-MCNC: NEGATIVE — SIGNIFICANT CHANGE UP
BUN SERPL-MCNC: 8 MG/DL — SIGNIFICANT CHANGE UP (ref 7–23)
CALCIUM SERPL-MCNC: 9.5 MG/DL — SIGNIFICANT CHANGE UP (ref 8.4–10.5)
CHLORIDE SERPL-SCNC: 103 MMOL/L — SIGNIFICANT CHANGE UP (ref 96–108)
CO2 SERPL-SCNC: 22 MMOL/L — SIGNIFICANT CHANGE UP (ref 22–31)
COLOR SPEC: YELLOW — SIGNIFICANT CHANGE UP
COMMENT - URINE: SIGNIFICANT CHANGE UP
CREAT ?TM UR-MCNC: 38 MG/DL — SIGNIFICANT CHANGE UP
CREAT SERPL-MCNC: 0.85 MG/DL — SIGNIFICANT CHANGE UP (ref 0.5–1.3)
DIFF PNL FLD: ABNORMAL
EGFR: 89 ML/MIN/1.73M2 — SIGNIFICANT CHANGE UP
EOSINOPHIL # BLD AUTO: 0.12 K/UL — SIGNIFICANT CHANGE UP (ref 0–0.5)
EOSINOPHIL NFR BLD AUTO: 2 % — SIGNIFICANT CHANGE UP (ref 0–6)
EPI CELLS # UR: SIGNIFICANT CHANGE UP /HPF (ref 0–5)
GLUCOSE SERPL-MCNC: 68 MG/DL — LOW (ref 70–99)
GLUCOSE UR QL: NEGATIVE — SIGNIFICANT CHANGE UP
HCT VFR BLD CALC: 41.9 % — SIGNIFICANT CHANGE UP (ref 34.5–45)
HGB BLD-MCNC: 13 G/DL — SIGNIFICANT CHANGE UP (ref 11.5–15.5)
IMM GRANULOCYTES NFR BLD AUTO: 0.3 % — SIGNIFICANT CHANGE UP (ref 0–0.9)
INR BLD: 1.02 — SIGNIFICANT CHANGE UP (ref 0.88–1.16)
KETONES UR-MCNC: NEGATIVE — SIGNIFICANT CHANGE UP
LDH SERPL L TO P-CCNC: SIGNIFICANT CHANGE UP (ref 50–242)
LEUKOCYTE ESTERASE UR-ACNC: ABNORMAL
LYMPHOCYTES # BLD AUTO: 1.75 K/UL — SIGNIFICANT CHANGE UP (ref 1–3.3)
LYMPHOCYTES # BLD AUTO: 28.5 % — SIGNIFICANT CHANGE UP (ref 13–44)
MAGNESIUM SERPL-MCNC: 6.1 MG/DL — HIGH (ref 1.6–2.6)
MCHC RBC-ENTMCNC: 27 PG — SIGNIFICANT CHANGE UP (ref 27–34)
MCHC RBC-ENTMCNC: 31 GM/DL — LOW (ref 32–36)
MCV RBC AUTO: 86.9 FL — SIGNIFICANT CHANGE UP (ref 80–100)
MONOCYTES # BLD AUTO: 0.35 K/UL — SIGNIFICANT CHANGE UP (ref 0–0.9)
MONOCYTES NFR BLD AUTO: 5.7 % — SIGNIFICANT CHANGE UP (ref 2–14)
NEUTROPHILS # BLD AUTO: 3.86 K/UL — SIGNIFICANT CHANGE UP (ref 1.8–7.4)
NEUTROPHILS NFR BLD AUTO: 63 % — SIGNIFICANT CHANGE UP (ref 43–77)
NITRITE UR-MCNC: NEGATIVE — SIGNIFICANT CHANGE UP
NRBC # BLD: 0 /100 WBCS — SIGNIFICANT CHANGE UP (ref 0–0)
PH UR: 6 — SIGNIFICANT CHANGE UP (ref 5–8)
PLATELET # BLD AUTO: 462 K/UL — HIGH (ref 150–400)
POTASSIUM SERPL-MCNC: SIGNIFICANT CHANGE UP (ref 3.5–5.3)
POTASSIUM SERPL-SCNC: SIGNIFICANT CHANGE UP (ref 3.5–5.3)
PROT ?TM UR-MCNC: 8 MG/DL — SIGNIFICANT CHANGE UP (ref 0–12)
PROT SERPL-MCNC: 7.8 G/DL — SIGNIFICANT CHANGE UP (ref 6–8.3)
PROT UR-MCNC: NEGATIVE MG/DL — SIGNIFICANT CHANGE UP
PROT/CREAT UR-RTO: 0.2 RATIO — SIGNIFICANT CHANGE UP (ref 0–0.2)
PROTHROM AB SERPL-ACNC: 12.2 SEC — SIGNIFICANT CHANGE UP (ref 10.5–13.4)
RBC # BLD: 4.82 M/UL — SIGNIFICANT CHANGE UP (ref 3.8–5.2)
RBC # FLD: 16.5 % — HIGH (ref 10.3–14.5)
RBC CASTS # UR COMP ASSIST: ABNORMAL /HPF
SODIUM SERPL-SCNC: 139 MMOL/L — SIGNIFICANT CHANGE UP (ref 135–145)
SP GR SPEC: 1.01 — SIGNIFICANT CHANGE UP (ref 1–1.03)
URATE SERPL-MCNC: 7.2 MG/DL — HIGH (ref 2.5–7)
UROBILINOGEN FLD QL: 1 E.U./DL — SIGNIFICANT CHANGE UP
WBC # BLD: 6.13 K/UL — SIGNIFICANT CHANGE UP (ref 3.8–10.5)
WBC # FLD AUTO: 6.13 K/UL — SIGNIFICANT CHANGE UP (ref 3.8–10.5)
WBC UR QL: ABNORMAL /HPF

## 2023-06-21 PROCEDURE — 99291 CRITICAL CARE FIRST HOUR: CPT

## 2023-06-21 RX ORDER — IBUPROFEN 200 MG
600 TABLET ORAL EVERY 6 HOURS
Refills: 0 | Status: DISCONTINUED | OUTPATIENT
Start: 2023-06-21 | End: 2023-06-23

## 2023-06-21 RX ORDER — SIMETHICONE 80 MG/1
80 TABLET, CHEWABLE ORAL EVERY 4 HOURS
Refills: 0 | Status: DISCONTINUED | OUTPATIENT
Start: 2023-06-21 | End: 2023-06-23

## 2023-06-21 RX ORDER — DIPHENHYDRAMINE HCL 50 MG
25 CAPSULE ORAL EVERY 6 HOURS
Refills: 0 | Status: DISCONTINUED | OUTPATIENT
Start: 2023-06-21 | End: 2023-06-23

## 2023-06-21 RX ORDER — ACETAMINOPHEN 500 MG
975 TABLET ORAL
Refills: 0 | Status: DISCONTINUED | OUTPATIENT
Start: 2023-06-21 | End: 2023-06-23

## 2023-06-21 RX ORDER — NIFEDIPINE 30 MG
30 TABLET, EXTENDED RELEASE 24 HR ORAL EVERY 24 HOURS
Refills: 0 | Status: DISCONTINUED | OUTPATIENT
Start: 2023-06-21 | End: 2023-06-21

## 2023-06-21 RX ORDER — MAGNESIUM SULFATE 500 MG/ML
4 VIAL (ML) INJECTION ONCE
Refills: 0 | Status: COMPLETED | OUTPATIENT
Start: 2023-06-21 | End: 2023-06-21

## 2023-06-21 RX ORDER — MAGNESIUM HYDROXIDE 400 MG/1
30 TABLET, CHEWABLE ORAL
Refills: 0 | Status: DISCONTINUED | OUTPATIENT
Start: 2023-06-21 | End: 2023-06-23

## 2023-06-21 RX ORDER — SODIUM CHLORIDE 9 MG/ML
1000 INJECTION, SOLUTION INTRAVENOUS
Refills: 0 | Status: DISCONTINUED | OUTPATIENT
Start: 2023-06-21 | End: 2023-06-23

## 2023-06-21 RX ORDER — MAGNESIUM SULFATE 500 MG/ML
1 VIAL (ML) INJECTION
Qty: 40 | Refills: 0 | Status: DISCONTINUED | OUTPATIENT
Start: 2023-06-21 | End: 2023-06-22

## 2023-06-21 RX ORDER — LANOLIN
1 OINTMENT (GRAM) TOPICAL EVERY 6 HOURS
Refills: 0 | Status: DISCONTINUED | OUTPATIENT
Start: 2023-06-21 | End: 2023-06-23

## 2023-06-21 RX ORDER — NIFEDIPINE 30 MG
30 TABLET, EXTENDED RELEASE 24 HR ORAL EVERY 24 HOURS
Refills: 0 | Status: DISCONTINUED | OUTPATIENT
Start: 2023-06-21 | End: 2023-06-22

## 2023-06-21 RX ORDER — HYDRALAZINE HCL 50 MG
10 TABLET ORAL ONCE
Refills: 0 | Status: COMPLETED | OUTPATIENT
Start: 2023-06-21 | End: 2023-06-21

## 2023-06-21 RX ORDER — OXYTOCIN 10 UNIT/ML
333.33 VIAL (ML) INJECTION
Qty: 20 | Refills: 0 | Status: DISCONTINUED | OUTPATIENT
Start: 2023-06-21 | End: 2023-06-23

## 2023-06-21 RX ORDER — TETANUS TOXOID, REDUCED DIPHTHERIA TOXOID AND ACELLULAR PERTUSSIS VACCINE, ADSORBED 5; 2.5; 8; 8; 2.5 [IU]/.5ML; [IU]/.5ML; UG/.5ML; UG/.5ML; UG/.5ML
0.5 SUSPENSION INTRAMUSCULAR ONCE
Refills: 0 | Status: DISCONTINUED | OUTPATIENT
Start: 2023-06-21 | End: 2023-06-23

## 2023-06-21 RX ADMIN — Medication 300 GRAM(S): at 15:48

## 2023-06-21 RX ADMIN — Medication 10 MILLIGRAM(S): at 16:25

## 2023-06-21 RX ADMIN — Medication 10 MILLIGRAM(S): at 15:54

## 2023-06-21 RX ADMIN — Medication 50 GM/HR: at 16:19

## 2023-06-21 RX ADMIN — Medication 30 MILLIGRAM(S): at 19:28

## 2023-06-21 NOTE — H&P ADULT - NSHPPHYSICALEXAM_GEN_ALL_CORE
Gen: Alert, NAD  Abd: Soft, nontender, c section incision clean/dry/intact  Ext: 1+ brachioradialis reflexes b/l  Pulm: Lungs CTAB

## 2023-06-21 NOTE — H&P ADULT - ASSESSMENT
41 yo  s/p c section POD 14 with severe PEC (BPs)  - Admit to high risk postpartum  - IV Mg sulfate infusion for 24 hours, monitor BPs closely, clinical and serum mg checks q6h  - F/u CBC/CMP  - IV Hydral 10mg given for sustained severe range BPs in OR, f/u 20 min repeat  - Offered tylenol for headache, patient declined    D/w Dr. Stephenson

## 2023-06-21 NOTE — ED ADULT NURSE NOTE - OBJECTIVE STATEMENT
Pt. a&ox4 ambulatory with steady gait 14 days PP , , no complications during pregnancy, delivery, or PP< comes to ED sent in by her OBGYN for admission of pre-eclampsia. Pt. denies ever having high BP prior or during pregnancy ;  incision appears clean and dry. @ first PP f/u appt ; pt. had sbp in 140s-160s ; @ triage in ED 180s. Pt. made UG to MD Knight, placed on ccm, pulseox, and BP Q15min. 2 larg bore IVs placed ; OB paged to bedside, med protocol initiated as per orders and parameters. EKG done. Pt. only associated s/s is mild frontal HA ; denies dizziness, lightheadedness, weakness, excessive vaginal bleeding (has been spotting), vision changes, cp, SOB.

## 2023-06-21 NOTE — ED ADULT TRIAGE NOTE - CHIEF COMPLAINT QUOTE
14 days PP via  presents sent by OBCHRISTAN d/t elevated BP, sent to r/o preecclampsia. Pt denies sx.

## 2023-06-21 NOTE — ED ADULT NURSE NOTE - NSFALLUNIVINTERV_ED_ALL_ED
Bed/Stretcher in lowest position, wheels locked, appropriate side rails in place/Call bell, personal items and telephone in reach/Instruct patient to call for assistance before getting out of bed/chair/stretcher/Non-slip footwear applied when patient is off stretcher/[place] to call system/Physically safe environment - no spills, clutter or unnecessary equipment/Purposeful proactive rounding/Room/bathroom lighting operational, light cord in reach

## 2023-06-21 NOTE — ED PROVIDER NOTE - CLINICAL SUMMARY MEDICAL DECISION MAKING FREE TEXT BOX
Pt with elevated blood pressure 2 weeks  without symptoms - full set of preeclampsia labs ordered, Magnesium IV started in ED in additional to IV hydralazine pushes.      Pt to be admitted to GYN for further mgmt.

## 2023-06-21 NOTE — H&P ADULT - HISTORY OF PRESENT ILLNESS
43 yo  s/p primary c section on  for breech presentation, uncomplicated. Patient discharged on POD3. Patient was seen in the office today for her post op/postpartum visit and was noted to have severe range BPs with a mild 2/10 frontal headache. Patient has not taken any medications for the headache but reports it is not bothersome. Patient denies shortness of breath, scotoma, RUQ/epigastric pain. From a post-op and postpartum perspective she is healing well. She denies heavy vaginal bleeding, reports pain is well controlled, and she is ambulating and tolerating regular diet, voiding without difficulty.    OBHx s/p c section on ,     GYN: history of abdominal myomectomy     PMH Denies  PSH c section , myomectomy abdominal 2017  Meds Denies  NKDA    Vital Signs Last 24 Hrs  T(C): 37.2 (2023 15:07), Max: 37.2 (2023 15:07)  T(F): 98.9 (2023 15:07), Max: 98.9 (2023 15:07)  HR: 60 (2023 15:55) (51 - 60)  BP: 149/86 (2023 15:55) (149/86 - 183/87)  BP(mean): --  RR: 18 (2023 15:55) (16 - 18)  SpO2: 100% (2023 15:55) (100% - 100%)    Parameters below as of 2023 15:55  Patient On (Oxygen Delivery Method): room air

## 2023-06-21 NOTE — ED PROVIDER NOTE - OBJECTIVE STATEMENT
43 y/o f s/p  23 presents to ED with elevated blood pressure.  Pt denies headache, blurry vision, leg edema.  Pt had uncomplicated pregnancy -no chest pain or shortness of breath.  NO fever, chills, infectious symptoms.

## 2023-06-21 NOTE — ED ADULT NURSE REASSESSMENT NOTE - NS ED NURSE REASSESS COMMENT FT1
BIANCA Chappell and [facility] escorted pt. upstairs to room. Report had been given to 6 [facility] BIANCA Luu @ 5pm ; asked if we could wait to bring pt. up until 530p to clean room. Pt. brought up @ 6pm ; very difficult for accepting unit to accept pt; poor bedside manner and pt. became upset at the confusion accepting unit displayed.

## 2023-06-22 LAB
ALBUMIN SERPL ELPH-MCNC: 3.9 G/DL — SIGNIFICANT CHANGE UP (ref 3.3–5)
ALP SERPL-CCNC: 113 U/L — SIGNIFICANT CHANGE UP (ref 40–120)
ALT FLD-CCNC: 14 U/L — SIGNIFICANT CHANGE UP (ref 10–45)
ANION GAP SERPL CALC-SCNC: 13 MMOL/L — SIGNIFICANT CHANGE UP (ref 5–17)
AST SERPL-CCNC: 18 U/L — SIGNIFICANT CHANGE UP (ref 10–40)
BASOPHILS # BLD AUTO: 0.02 K/UL — SIGNIFICANT CHANGE UP (ref 0–0.2)
BASOPHILS NFR BLD AUTO: 0.3 % — SIGNIFICANT CHANGE UP (ref 0–2)
BILIRUB SERPL-MCNC: 0.5 MG/DL — SIGNIFICANT CHANGE UP (ref 0.2–1.2)
BUN SERPL-MCNC: 9 MG/DL — SIGNIFICANT CHANGE UP (ref 7–23)
CALCIUM SERPL-MCNC: 7.9 MG/DL — LOW (ref 8.4–10.5)
CHLORIDE SERPL-SCNC: 100 MMOL/L — SIGNIFICANT CHANGE UP (ref 96–108)
CO2 SERPL-SCNC: 23 MMOL/L — SIGNIFICANT CHANGE UP (ref 22–31)
CREAT SERPL-MCNC: 0.76 MG/DL — SIGNIFICANT CHANGE UP (ref 0.5–1.3)
EGFR: 102 ML/MIN/1.73M2 — SIGNIFICANT CHANGE UP
EOSINOPHIL # BLD AUTO: 0.1 K/UL — SIGNIFICANT CHANGE UP (ref 0–0.5)
EOSINOPHIL NFR BLD AUTO: 1.3 % — SIGNIFICANT CHANGE UP (ref 0–6)
GLUCOSE SERPL-MCNC: 104 MG/DL — HIGH (ref 70–99)
HCT VFR BLD CALC: 42 % — SIGNIFICANT CHANGE UP (ref 34.5–45)
HGB BLD-MCNC: 13.8 G/DL — SIGNIFICANT CHANGE UP (ref 11.5–15.5)
IMM GRANULOCYTES NFR BLD AUTO: 0.4 % — SIGNIFICANT CHANGE UP (ref 0–0.9)
LYMPHOCYTES # BLD AUTO: 1.8 K/UL — SIGNIFICANT CHANGE UP (ref 1–3.3)
LYMPHOCYTES # BLD AUTO: 23 % — SIGNIFICANT CHANGE UP (ref 13–44)
MAGNESIUM SERPL-MCNC: 6.5 MG/DL — HIGH (ref 1.6–2.6)
MCHC RBC-ENTMCNC: 27.4 PG — SIGNIFICANT CHANGE UP (ref 27–34)
MCHC RBC-ENTMCNC: 32.9 GM/DL — SIGNIFICANT CHANGE UP (ref 32–36)
MCV RBC AUTO: 83.5 FL — SIGNIFICANT CHANGE UP (ref 80–100)
MONOCYTES # BLD AUTO: 0.58 K/UL — SIGNIFICANT CHANGE UP (ref 0–0.9)
MONOCYTES NFR BLD AUTO: 7.4 % — SIGNIFICANT CHANGE UP (ref 2–14)
NEUTROPHILS # BLD AUTO: 5.31 K/UL — SIGNIFICANT CHANGE UP (ref 1.8–7.4)
NEUTROPHILS NFR BLD AUTO: 67.6 % — SIGNIFICANT CHANGE UP (ref 43–77)
NRBC # BLD: 0 /100 WBCS — SIGNIFICANT CHANGE UP (ref 0–0)
PLATELET # BLD AUTO: 433 K/UL — HIGH (ref 150–400)
POTASSIUM SERPL-MCNC: 3.8 MMOL/L — SIGNIFICANT CHANGE UP (ref 3.5–5.3)
POTASSIUM SERPL-SCNC: 3.8 MMOL/L — SIGNIFICANT CHANGE UP (ref 3.5–5.3)
PROT SERPL-MCNC: 7.6 G/DL — SIGNIFICANT CHANGE UP (ref 6–8.3)
RBC # BLD: 5.03 M/UL — SIGNIFICANT CHANGE UP (ref 3.8–5.2)
RBC # FLD: 16.3 % — HIGH (ref 10.3–14.5)
SODIUM SERPL-SCNC: 136 MMOL/L — SIGNIFICANT CHANGE UP (ref 135–145)
WBC # BLD: 7.84 K/UL — SIGNIFICANT CHANGE UP (ref 3.8–10.5)
WBC # FLD AUTO: 7.84 K/UL — SIGNIFICANT CHANGE UP (ref 3.8–10.5)

## 2023-06-22 RX ORDER — NIFEDIPINE 30 MG
30 TABLET, EXTENDED RELEASE 24 HR ORAL ONCE
Refills: 0 | Status: COMPLETED | OUTPATIENT
Start: 2023-06-22 | End: 2023-06-22

## 2023-06-22 RX ORDER — NIFEDIPINE 30 MG
30 TABLET, EXTENDED RELEASE 24 HR ORAL EVERY 12 HOURS
Refills: 0 | Status: DISCONTINUED | OUTPATIENT
Start: 2023-06-22 | End: 2023-06-23

## 2023-06-22 RX ORDER — METOCLOPRAMIDE HCL 10 MG
10 TABLET ORAL ONCE
Refills: 0 | Status: COMPLETED | OUTPATIENT
Start: 2023-06-22 | End: 2023-06-22

## 2023-06-22 RX ADMIN — Medication 10 MILLIGRAM(S): at 07:56

## 2023-06-22 RX ADMIN — Medication 30 MILLIGRAM(S): at 18:54

## 2023-06-22 RX ADMIN — Medication 975 MILLIGRAM(S): at 03:04

## 2023-06-22 RX ADMIN — Medication 30 MILLIGRAM(S): at 10:03

## 2023-06-22 RX ADMIN — Medication 975 MILLIGRAM(S): at 10:03

## 2023-06-22 RX ADMIN — Medication 975 MILLIGRAM(S): at 11:00

## 2023-06-22 NOTE — PROGRESS NOTE ADULT - ATTENDING COMMENTS
Agree with plan to continue magnesium and BP management.   Pending normal blood pressures will plan for discharge tomorrow. Agree with plan to continue magnesium and BP management.   Pending normal blood pressures will plan for discharge tomorrow pending no signs of PreE

## 2023-06-22 NOTE — CHART NOTE - NSCHARTNOTEFT_GEN_A_CORE
Patient evaluated at bedside for clinical magnesium check. Serum magnesium to be drawn at 04:00.  She reports mild HA. She denies visual disturbances including scotoma, and right upper quadrant pain. Also denies nausea/vomiting/epigastric pain/shortness of breath. Pain well controlled.      T(C): 36.8 (06-22-23 @ 05:30), Max: 36.8 (06-22-23 @ 05:30)  HR: 70 (06-22-23 @ 05:30) (70 - 87)  BP: 126/77 (06-22-23 @ 05:30) (123/70 - 148/81)  RR: 19 (06-22-23 @ 05:30) (16 - 19)  SpO2: 99% (06-22-23 @ 05:30) (97% - 100%)  Wt(kg): --  Daily Height in cm: 175.26 (21 Jun 2023 15:07)    Daily     06-21 @ 07:01  -  06-22 @ 07:00  --------------------------------------------------------  IN: 1575 mL / OUT: 3200 mL / NET: -1625 mL      Gen: NAD, AAOx3  CV: RRR, no M/R/G  Pulm: CTAB, no R/R/W  Abd: soft, nontender, no rebound or guarding, no epigastric tenderness, liver nonpalpable +BS, fundus palpated   : Marte in place  Ext: +1 edema starr, SCDs in place, Reflexes 2+                          13.8   7.84  )-----------( 433      ( 22 Jun 2023 03:34 )             42.0     06-22    136  |  100  |  9   ----------------------------<  104<H>  3.8   |  23  |  0.76    Ca    7.9<L>      22 Jun 2023 03:34  Mg     6.5     06-22    TPro  7.6  /  Alb  3.9  /  TBili  0.5  /  DBili  x   /  AST  18  /  ALT  14  /  AlkPhos  113  06-22    acetaminophen     Tablet .. 975 milliGRAM(s) Oral <User Schedule> PRN  diphenhydrAMINE 25 milliGRAM(s) Oral every 6 hours PRN  diphtheria/tetanus/pertussis (acellular) Vaccine (Adacel) 0.5 milliLiter(s) IntraMuscular once  ibuprofen  Tablet. 600 milliGRAM(s) Oral every 6 hours PRN  lactated ringers. 1000 milliLiter(s) IV Continuous <Continuous>  lanolin Ointment 1 Application(s) Topical every 6 hours PRN  magnesium hydroxide Suspension 30 milliLiter(s) Oral two times a day PRN  magnesium sulfate Infusion 1 Gm/Hr IV Continuous <Continuous>  NIFEdipine XL 30 milliGRAM(s) Oral every 24 hours  oxytocin Infusion 333.333 milliUNIT(s)/Min IV Continuous <Continuous>  simethicone 80 milliGRAM(s) Chew every 4 hours PRN      06-21-23 @ 07:01  -  06-22-23 @ 07:00  --------------------------------------------------------  IN: 1575 mL / OUT: 3200 mL / NET: -1625 mL    A&P:  42y s/p pCS complicated by postpartum preeclampsia with severe features    1. Preeclampsia: Continue IV Magnesium @2G/hr for 24 hrs post delivery.  No complaints currently.   Antihypertensives: Nifedipine 30mg qd  Continue to monitor blood pressures  Next Magnesium check @ 10:00  Follow up on Magnesium serum level     2. GI: Regular diet    3. : voiding freely Patient evaluated at bedside for clinical magnesium check. Serum magnesium to be drawn at 04:00.  She reports mild HA. She denies visual disturbances including scotoma, and right upper quadrant pain. Also denies nausea/vomiting/epigastric pain/shortness of breath. Pain well controlled.      T(C): 36.8 (06-22-23 @ 05:30), Max: 36.8 (06-22-23 @ 05:30)  HR: 70 (06-22-23 @ 05:30) (70 - 87)  BP: 126/77 (06-22-23 @ 05:30) (123/70 - 148/81)  RR: 19 (06-22-23 @ 05:30) (16 - 19)  SpO2: 99% (06-22-23 @ 05:30) (97% - 100%)  Wt(kg): --  Daily Height in cm: 175.26 (21 Jun 2023 15:07)    Daily     06-21 @ 07:01  -  06-22 @ 07:00  --------------------------------------------------------  IN: 1575 mL / OUT: 3200 mL / NET: -1625 mL      Gen: NAD, AAOx3  CV: RRR, no M/R/G  Pulm: CTAB, no R/R/W  Abd: soft, nontender, no rebound or guarding, no epigastric tenderness, liver nonpalpable +BS, fundus palpated   : Marte in place  Ext: +1 edema starr, SCDs in place, Reflexes 2+                          13.8   7.84  )-----------( 433      ( 22 Jun 2023 03:34 )             42.0     06-22    136  |  100  |  9   ----------------------------<  104<H>  3.8   |  23  |  0.76    Ca    7.9<L>      22 Jun 2023 03:34  Mg     6.5     06-22    TPro  7.6  /  Alb  3.9  /  TBili  0.5  /  DBili  x   /  AST  18  /  ALT  14  /  AlkPhos  113  06-22    acetaminophen     Tablet .. 975 milliGRAM(s) Oral <User Schedule> PRN  diphenhydrAMINE 25 milliGRAM(s) Oral every 6 hours PRN  diphtheria/tetanus/pertussis (acellular) Vaccine (Adacel) 0.5 milliLiter(s) IntraMuscular once  ibuprofen  Tablet. 600 milliGRAM(s) Oral every 6 hours PRN  lactated ringers. 1000 milliLiter(s) IV Continuous <Continuous>  lanolin Ointment 1 Application(s) Topical every 6 hours PRN  magnesium hydroxide Suspension 30 milliLiter(s) Oral two times a day PRN  magnesium sulfate Infusion 1 Gm/Hr IV Continuous <Continuous>  NIFEdipine XL 30 milliGRAM(s) Oral every 24 hours  oxytocin Infusion 333.333 milliUNIT(s)/Min IV Continuous <Continuous>  simethicone 80 milliGRAM(s) Chew every 4 hours PRN      06-21-23 @ 07:01  -  06-22-23 @ 07:00  --------------------------------------------------------  IN: 1575 mL / OUT: 3200 mL / NET: -1625 mL    A&P:  42y s/p pCS complicated by postpartum preeclampsia with severe features    1. Preeclampsia: Continue IV Magnesium @2G/hr for 24 hrs post delivery.  No complaints currently. Mild range BPs.   Antihypertensives: Nifedipine 30mg qd  Continue to monitor blood pressures  Next Magnesium check @ 10:00  Follow up on Magnesium serum level     2. GI: Regular diet    3. : voiding freely Patient evaluated at bedside for clinical magnesium check. Serum magnesium to be drawn at 04:00.  She reports mild HA. She denies visual disturbances including scotoma, and right upper quadrant pain. Also denies nausea/vomiting/epigastric pain/shortness of breath. Pain well controlled.      T(C): 36.8 (06-22-23 @ 05:30), Max: 36.8 (06-22-23 @ 05:30)  HR: 70 (06-22-23 @ 05:30) (70 - 87)  BP: 126/77 (06-22-23 @ 05:30) (123/70 - 148/81)  RR: 19 (06-22-23 @ 05:30) (16 - 19)  SpO2: 99% (06-22-23 @ 05:30) (97% - 100%)  Wt(kg): --  Daily Height in cm: 175.26 (21 Jun 2023 15:07)    Daily     06-21 @ 07:01  -  06-22 @ 07:00  --------------------------------------------------------  IN: 1575 mL / OUT: 3200 mL / NET: -1625 mL      Gen: NAD, AAOx3  CV: RRR, no M/R/G  Pulm: CTAB, no R/R/W  Abd: soft, nontender, no rebound or guarding, no epigastric tenderness, liver nonpalpable +BS, fundus palpated   : Marte in place  Ext: +1 edema starr, SCDs in place, Reflexes 2+                          13.8   7.84  )-----------( 433      ( 22 Jun 2023 03:34 )             42.0     06-22    136  |  100  |  9   ----------------------------<  104<H>  3.8   |  23  |  0.76    Ca    7.9<L>      22 Jun 2023 03:34  Mg     6.5     06-22    TPro  7.6  /  Alb  3.9  /  TBili  0.5  /  DBili  x   /  AST  18  /  ALT  14  /  AlkPhos  113  06-22    acetaminophen     Tablet .. 975 milliGRAM(s) Oral <User Schedule> PRN  diphenhydrAMINE 25 milliGRAM(s) Oral every 6 hours PRN  diphtheria/tetanus/pertussis (acellular) Vaccine (Adacel) 0.5 milliLiter(s) IntraMuscular once  ibuprofen  Tablet. 600 milliGRAM(s) Oral every 6 hours PRN  lactated ringers. 1000 milliLiter(s) IV Continuous <Continuous>  lanolin Ointment 1 Application(s) Topical every 6 hours PRN  magnesium hydroxide Suspension 30 milliLiter(s) Oral two times a day PRN  magnesium sulfate Infusion 1 Gm/Hr IV Continuous <Continuous>  NIFEdipine XL 30 milliGRAM(s) Oral every 24 hours  oxytocin Infusion 333.333 milliUNIT(s)/Min IV Continuous <Continuous>  simethicone 80 milliGRAM(s) Chew every 4 hours PRN      06-21-23 @ 07:01  -  06-22-23 @ 07:00  --------------------------------------------------------  IN: 1575 mL / OUT: 3200 mL / NET: -1625 mL    A&P:  42y s/p pCS complicated by postpartum preeclampsia with severe features    1. Preeclampsia: Continue IV Magnesium @2G/hr for 24 hrs post delivery.  No complaints currently. Mild range BPs.   Antihypertensives: Nifedipine 30mg qd  BID  Continue to monitor blood pressures  Next Magnesium check @ 10:00  Follow up on Magnesium serum level     2. GI: Regular diet    3. : voiding freely        Agree with plan to continue magnesium and BP management. Will plan for nifedipine BID

## 2023-06-23 ENCOUNTER — TRANSCRIPTION ENCOUNTER (OUTPATIENT)
Age: 43
End: 2023-06-23

## 2023-06-23 VITALS
DIASTOLIC BLOOD PRESSURE: 88 MMHG | TEMPERATURE: 99 F | OXYGEN SATURATION: 99 % | RESPIRATION RATE: 17 BRPM | HEART RATE: 63 BPM | SYSTOLIC BLOOD PRESSURE: 144 MMHG

## 2023-06-23 LAB
CULTURE RESULTS: SIGNIFICANT CHANGE UP
SPECIMEN SOURCE: SIGNIFICANT CHANGE UP

## 2023-06-23 PROCEDURE — 80053 COMPREHEN METABOLIC PANEL: CPT

## 2023-06-23 PROCEDURE — 96375 TX/PRO/DX INJ NEW DRUG ADDON: CPT

## 2023-06-23 PROCEDURE — 84156 ASSAY OF PROTEIN URINE: CPT

## 2023-06-23 PROCEDURE — 82570 ASSAY OF URINE CREATININE: CPT

## 2023-06-23 PROCEDURE — 99285 EMERGENCY DEPT VISIT HI MDM: CPT

## 2023-06-23 PROCEDURE — 85610 PROTHROMBIN TIME: CPT

## 2023-06-23 PROCEDURE — 83615 LACTATE (LD) (LDH) ENZYME: CPT

## 2023-06-23 PROCEDURE — 81001 URINALYSIS AUTO W/SCOPE: CPT

## 2023-06-23 PROCEDURE — 83735 ASSAY OF MAGNESIUM: CPT

## 2023-06-23 PROCEDURE — 36415 COLL VENOUS BLD VENIPUNCTURE: CPT

## 2023-06-23 PROCEDURE — 87086 URINE CULTURE/COLONY COUNT: CPT

## 2023-06-23 PROCEDURE — 85730 THROMBOPLASTIN TIME PARTIAL: CPT

## 2023-06-23 PROCEDURE — 96374 THER/PROPH/DIAG INJ IV PUSH: CPT

## 2023-06-23 PROCEDURE — 93005 ELECTROCARDIOGRAM TRACING: CPT

## 2023-06-23 PROCEDURE — 85025 COMPLETE CBC W/AUTO DIFF WBC: CPT

## 2023-06-23 PROCEDURE — 84550 ASSAY OF BLOOD/URIC ACID: CPT

## 2023-06-23 RX ORDER — ACETAMINOPHEN 500 MG
3 TABLET ORAL
Qty: 0 | Refills: 0 | DISCHARGE
Start: 2023-06-23

## 2023-06-23 RX ORDER — NIFEDIPINE 30 MG
1 TABLET, EXTENDED RELEASE 24 HR ORAL
Qty: 60 | Refills: 0
Start: 2023-06-23

## 2023-06-23 RX ADMIN — Medication 30 MILLIGRAM(S): at 05:26

## 2023-06-23 NOTE — PROGRESS NOTE ADULT - ASSESSMENT
A/P 42y s/p , PPD#16, HD3 c/b PEC w SF , stable, meeting postpartum milestones   #PEC w SF  - s/p IV Mg  - Normal to mild range BP  - On nifedipine 30 mg qd, sent to her home pharmacy    #PP Care  - Pain: well controlled on tylenol/motrin  - GI: Tolerating regular diet  - : urinating without difficulty/pain  - DVT prophylaxis: ambulating frequently  - Dispo: home today    Seen and discussed with Dr. Stephenson.     
A&P:   Pt is a 42y yo s/p CS , c/b PEC w/ SF, seen for a magnesium check.       1. Preeclampsia: Continue IV Magnesium @1G/hr for 24 hrs until 1600  Denying toxic symptoms currently.   Antihypertensives: nifedipine 30 BID   Continue to monitor blood pressures.   Last Magnesium serum level 6.5 . Follow up next level.     2. GI: regular diet    3. : strict Is and Os        
A/P: 42y s/p  section, HD3/POD16, re-admitted for PEC w/ SF of severe range BPs, stable  -  PEC w/ SF: normal to mild range BPs on Nifedipine 30mg BID, asymptomatic; s/p IV Mg   -  Pain: PO motrin/acetaminophen, oxycodone for severe pain PRN  -  Post-operatively labs: PEC labs wnl  -  GI: tolerating regular diet, passing gas  -  : voiding spontaneously  -  DVT prophylaxis: ambulation, SCDs,  -  Dispo: today

## 2023-06-23 NOTE — DISCHARGE NOTE OB - HOSPITAL COURSE
Patient readmitted postpartum for PEC w SF (BP, HA). She was treated with IV Mg. She has stable vitals and is denying toxic symptoms. Stable for discharge.

## 2023-06-23 NOTE — DISCHARGE NOTE OB - NSDCQMSTAIRS_GEN_ALL_CORE
Detail Level: Zone Plan: Pt will call if they want an RX of Ketoconazole Otc Regimen: Dove dermascalp lather and let sit on scalp for up to 5 minutes Render In Strict Bullet Format?: No Plan: -Vitamin C serum in the morning \\n\\n-Adapalene 0.3 % topical gel with pump \\nSig: Apply a pea sized amount to the face every night Initiate Treatment: triamcinolone acetonide 0.1 % topical cream BID\\nSig: Apply to the moderate affected areas of eczema twice daily for up to two weeks then as needed for flares No

## 2023-06-23 NOTE — DISCHARGE NOTE OB - MEDICATION SUMMARY - MEDICATIONS TO TAKE
I will START or STAY ON the medications listed below when I get home from the hospital:    ibuprofen 600 mg oral tablet  -- 1 tab(s) by mouth every 6 hours  -- Indication: For Pain    acetaminophen 325 mg oral tablet  -- 3 tab(s) by mouth every 6 hours as needed for Mild Pain (1 - 3)  -- Indication: For Pain    NIFEdipine 30 mg oral tablet, extended release  -- 1 tab(s) by mouth 2 times a day  -- Indication: For Preeclampsia in postpartum period    Prenatal 1 oral capsule  -- orally  -- Indication: For Postpartum

## 2023-06-23 NOTE — DISCHARGE NOTE OB - PLAN OF CARE
Follow up with your OB in one week for a blood pressure check.  Purchase electronic blood pressure cuff at your local pharmacy. Check blood pressure 3x a day and record pressures in a log. If bp >160/110, you develop a headache not relieved by tylenol, visual disturbances, chest pain, difficulty breathing, or right upper abdominal pain, call your doctor or the hospital, or go to your nearest emergency room.     Continue nifedipine 30 every 12 hours. If BP < 110/60 or HR > 110, do not take medication.

## 2023-06-23 NOTE — DISCHARGE NOTE OB - CARE PROVIDER_API CALL
Jeffery Stephenson  Obstetrics and Gynecology  [address], [address] [place] [address]  Phone: [phone]  Fax: [phone]  Follow Up Time:

## 2023-06-23 NOTE — DISCHARGE NOTE OB - CARE PLAN
Principal Discharge DX:	Preeclampsia in postpartum period  Assessment and plan of treatment:	Follow up with your OB in one week for a blood pressure check.  Purchase electronic blood pressure cuff at your local pharmacy. Check blood pressure 3x a day and record pressures in a log. If bp >160/110, you develop a headache not relieved by tylenol, visual disturbances, chest pain, difficulty breathing, or right upper abdominal pain, call your doctor or the hospital, or go to your nearest emergency room.     Continue nifedipine 30 every 12 hours. If BP < 110/60 or HR > 110, do not take medication.   1

## 2023-06-23 NOTE — DISCHARGE NOTE OB - PATIENT PORTAL LINK FT
You can access the FollowMyHealth Patient Portal offered by [facility] by registering at the following website: [email or web address]. By joining [facility]’s FollowMyHealth portal, you will also be able to view your health information using other applications (apps) compatible with our system.

## 2023-06-23 NOTE — PROGRESS NOTE ADULT - SUBJECTIVE AND OBJECTIVE BOX
Patient evaluated at bedside this morning on HD3/POD#16, resting comfortable in bed.   She reports pain is well controlled with acetaminophen and ibuprofen.  She denies headache, dizziness, chest pain, palpitations, shortness of breath, nausea, vomiting, or heavy vaginal bleeding.  She has been ambulating without assistance, voiding spontaneously, passing gas, tolerating regular diet, and is pumping.    Physical Exam:  Vital Signs Last 24 Hrs  T(C): 37 (23 Jun 2023 05:28), Max: 37 (22 Jun 2023 22:13)  T(F): 98.6 (23 Jun 2023 05:28), Max: 98.6 (22 Jun 2023 22:13)  HR: 63 (23 Jun 2023 05:28) (61 - 79)  BP: 144/88 (23 Jun 2023 05:28) (123/77 - 145/87)  BP(mean): --  RR: 17 (23 Jun 2023 05:28) (17 - 18)  SpO2: 99% (23 Jun 2023 05:28) (97% - 100%)    Parameters below as of 23 Jun 2023 05:28  Patient On (Oxygen Delivery Method): room air        GA: comfortable in NAD  CTAB: no increased work of breathing  Abd: soft, nontender, nondistended, no rebound or guarding, incision clean, dry and intact, uterus firm at midline, 2 fb below umbilicus  : lochia WNL  Extremities: no swelling or calf tenderness                             13.8   7.84  )-----------( 433      ( 22 Jun 2023 03:34 )             42.0     06-22    136  |  100  |  9   ----------------------------<  104<H>  3.8   |  23  |  0.76    Ca    7.9<L>      22 Jun 2023 03:34  Mg     6.5     06-22    TPro  7.6  /  Alb  3.9  /  TBili  0.5  /  DBili  x   /  AST  18  /  ALT  14  /  AlkPhos  113  06-22      PT/INR - ( 21 Jun 2023 15:50 )   PT: 12.2 sec;   INR: 1.02          PTT - ( 21 Jun 2023 15:50 )  PTT:35.3 sec  
Patient evaluated at bedside this morning, resting comfortable in bed, no acute events overnight.  She reports pain is well controlled with tylenol and motrin.  She denies headache, vision change,s dizziness, chest pain, palpitations, shortness of breath, nausea, vomiting, fever, chills, RUQ/epigastric pain, heavy vaginal bleeding. She has been ambulating without assistance, voiding spontaneously.  Tolerating food well, without nausea/vomit.      Physical Exam:  T(C): 37 (06-23-23 @ 05:28), Max: 37 (06-22-23 @ 22:13)  HR: 63 (06-23-23 @ 05:28) (63 - 79)  BP: 144/88 (06-23-23 @ 05:28) (139/89 - 144/88)  RR: 17 (06-23-23 @ 05:28) (17 - 18)  SpO2: 99% (06-23-23 @ 05:28) (97% - 99%)    GA: NAD, A&O x 3  Pulm: no increased work of breathing  Abd: soft, nontender, nondistended, no rebound or guarding, uterus firm.  Extremities: no swelling or calf tenderness                          13.8   7.84  )-----------( 433      ( 22 Jun 2023 03:34 )             42.0     06-22    136  |  100  |  9   ----------------------------<  104<H>  3.8   |  23  |  0.76    Ca    7.9<L>      22 Jun 2023 03:34  Mg     6.5     06-22    TPro  7.6  /  Alb  3.9  /  TBili  0.5  /  DBili  x   /  AST  18  /  ALT  14  /  AlkPhos  113  06-22    acetaminophen     Tablet .. 975 milliGRAM(s) Oral <User Schedule> PRN  diphenhydrAMINE 25 milliGRAM(s) Oral every 6 hours PRN  diphtheria/tetanus/pertussis (acellular) Vaccine (Adacel) 0.5 milliLiter(s) IntraMuscular once  ibuprofen  Tablet. 600 milliGRAM(s) Oral every 6 hours PRN  lactated ringers. 1000 milliLiter(s) IV Continuous <Continuous>  lanolin Ointment 1 Application(s) Topical every 6 hours PRN  magnesium hydroxide Suspension 30 milliLiter(s) Oral two times a day PRN  NIFEdipine XL 30 milliGRAM(s) Oral every 12 hours  oxytocin Infusion 333.333 milliUNIT(s)/Min IV Continuous <Continuous>  simethicone 80 milliGRAM(s) Chew every 4 hours PRN  
Pt is a 42y yo s/p CS , c/b PEC w/ SF, seen for a magnesium check. Endorses 1/10 HA that is improved since reglan. She denies vision changes, dizziness, SOB, n/v, RUQ/epigastric pain.     Physical Exam:  T(C): 36.6 (06-22-23 @ 08:00), Max: 37.2 (06-21-23 @ 15:07)  HR: 74 (06-22-23 @ 09:00) (51 - 98)  BP: 151/82 (06-22-23 @ 09:00) (123/70 - 183/87)  RR: 18 (06-22-23 @ 09:00) (16 - 19)  SpO2: 100% (06-22-23 @ 09:00) (97% - 100%)    06-21-23 @ 07:01  -  06-22-23 @ 07:00  --------------------------------------------------------  IN: 1575 mL / OUT: 3200 mL / NET: -1625 mL    06-22-23 @ 07:01  -  06-22-23 @ 10:24  --------------------------------------------------------  IN: 175 mL / OUT: 0 mL / NET: 175 mL      General: NAD, AAOx3  Pulm: no increased WOB, lungs clear to auscultation bilaterally  Abd: no tenderness in RUQ/epigastric areas  Extremities: warm/dry/intact skin, biceps reflexes 2+ bilaterally, no edema

## 2023-06-28 DIAGNOSIS — Z79.1 LONG TERM (CURRENT) USE OF NON-STEROIDAL ANTI-INFLAMMATORIES (NSAID): ICD-10-CM

## 2023-06-28 DIAGNOSIS — Z79.891 LONG TERM (CURRENT) USE OF OPIATE ANALGESIC: ICD-10-CM

## 2023-10-19 NOTE — LACTATION INITIAL EVALUATION - LACTATION INTERVENTIONS
Discuss consult with primary RN/initiate/review safe skin-to-skin/initiate/review hand expression/initiate/review pumping guidelines and safe milk handling/initiate/review techniques for position and latch/post discharge community resources provided/review techniques to increase milk supply/review techniques to manage sore nipples/engorgement/initiate/review breast massage/compression/initiate/review alternate feeding method/reviewed components of an effective feeding and at least 8 effective feedings per day required/reviewed importance of monitoring infant diapers, the breastfeeding log, and minimum output each day/reviewed risks of unnecessary formula supplementation/reviewed risks of artificial nipples/reviewed strategies to transition to breastfeeding only/reviewed benefits and recommendations for rooming in/reviewed feeding on demand/by cue at least 8 times a day/recommended follow-up with pediatrician within 24 hours of discharge/reviewed indications of inadequate milk transfer that would require supplementation Mustarde Flap Text: The defect edges were debeveled with a #15 scalpel blade.  Given the size, depth and location of the defect and the proximity to free margins a Mustarde flap was deemed most appropriate.  Using a sterile surgical marker, an appropriate flap was drawn incorporating the defect. The area thus outlined was incised with a #15 scalpel blade.  The skin margins were undermined to an appropriate distance in all directions utilizing iris scissors.

## 2024-01-05 NOTE — PATIENT PROFILE OB - BABYS CARE PROVIDER NAME, OB PROFILE
[place] Number Of Freeze-Thaw Cycles: 2 freeze-thaw cycles Post-Care Instructions: I reviewed with the patient in detail post-care instructions. Patient is to wear sunprotection, and avoid picking at any of the treated lesions. Pt may apply Vaseline to crusted or scabbing areas. Show Applicator Variable?: Yes Detail Level: Detailed Duration Of Freeze Thaw-Cycle (Seconds): 0 Consent: The patient's consent was obtained including but not limited to risks of crusting, scabbing, blistering, scarring, darker or lighter pigmentary change, recurrence, incomplete removal and infection. Render Post-Care Instructions In Note?: no

## 2025-01-13 NOTE — PATIENT PROFILE OB - CAREGIVER NAME
UPPER RESPIRATORY INFECTIONS (COMMON COLD)    An upper respiratory infection is also called a cold. It can affect your nose, throat, ears, and sinuses.    A cold is contagious and may be spread to others through coughing, sneezing, or close contact. It can also stay on objects and surfaces. You can become infected if you touch the object or surface and then touch your eyes, mouth, or nose.    Colds are caused by viruses and do not get better with antibiotics. Most people get better in 7 to 14 days. You may continue to cough for 2 to 3 weeks.      Criteria for antibiotics include:  Upper respiratory infections lasting longer than 10-14 days without improvement.  New or worsening symptoms after 5 to 7 days  Worsening symptoms after initial improvement.   Co-existing infection such as Acute Otitis Media (ear infection).     The use of antibiotics for nonbacterial upper respiratory infections has resulted in a severe problem with the emergence of bacteria which are resistant to multiple forms of antibiotics, and some bacteria are currently only treatable with intravenous antibiotics.    The following are suggestions to help you with upper respiratory symptoms.    Body Aches/Pains/Fever  For patients who are not allergic to and are not on anticoagulants, you can alternate Tylenol every 4 hours and Motrin every 6 hours for fever above 100.4F and/or pain.  For patients who are allergic or intolerant to NSAIDS, have gastritis, gastric ulcers, or history of GI bleeds, are pregnant, or are on anticoagulant therapy, you can take Tylenol every 4 hours as needed for fever above 100.4F and/or pain.    Congestion:    Nasal Saline   Nasal saline is available over the counter. There are several different commercial preparations such as Ocean spray and [place] spray. There is no limit on the use of Nasal saline. Saline is used by snorting the mist up into the nose then later gently blowing the nose to get rid of any secretions that it  has loosened.    Nasal irrigation   Flushing the nose and sinuses with a saline solution several times per day can decrease pain associated with congestion and shorten the duration of symptoms.     Decongestant Nasal Sprays  Over-the-counter decongestant nasal spray such as Afrin, may be helpful as an initial step in treating upper respiratory infections. This spray can be used for up to approximately 3 days and is used no more than twice per day. Topical nasal decongestant spray for longer than 5 days will result in a physical addiction, in which the nasal lining will become significantly swollen and irritated until the spray is used again. They May also cause elevated blood pressure.    Nasal Steroids  Nasal steroids, such as Flonase, can be beneficial and help reduce swelling inside the nose. These drugs have few side effects and relieve symptoms in most people. Unfortunately, they do not begin to work for 2-3 days and do not reach their maximum benefit for approximately 2-3 weeks.   There are several nasal steroids available by prescription as well as a few that can be purchased without a prescription (over the counter). These drugs are all effective but differ in how frequently they must be used and how much they cost.    Nasal anticholinergics  Ipratropium bromide (nasal spray) is available by prescription and can be very effective in decreasing the symptom of runny nose and other related symptoms (eg, post-nasal drainage, sore throat). These sprays, like all medications, can interact with other medications, so it is important that your complete medication list be reviewed by your physician before you take this medication.    If you develop a bloody nose, stop using the medication immediately.    Oral Decongestant  Use pseudoephedrine (behind the counter) for sinus pressure and congestion- Pseudoephedrine 30 mg up to 240 mg /day. Common brands include Sudafed, Zephrex-D Wal-phed.  Warning: It can raise your  blood pressure and give you palpitations, avoid with history of high blood pressure, palpitations, and severe cardiac disease.  Coricidin HBP is okay to use if you have high blood pressure.     Mucous Thinners/Decongestant Combination   Mucous thinners and decongestants are used to shrink down the tissues and promote sinus drainage. There are multiple prescription and over-the-counter medications available. A mucous thinner will tend to be drying unless you are also drinking plenty of water when taking these. If you have high blood pressure, it is very important to monitor your pressure while on decongestants. The mucous thinner/decongestant combinations are typically given twice per day. However, some people will be unable to tolerate these at night and should only take them once per day.    Clear Runny Nose/Allergic Rhinitis:  Use an antihistamine to help dry you out or nasal anticholinergics as mentioned above.     Antihistamines  Antihistamines are available both over the counter and as a prescription. There are also various decongestant and antihistamine combinations available such as Claritin, Allegra, and Zyrtec. It is best to take any antihistamine-decongestant combination in the morning to avoid insomnia. Zyrtec should probably be taken at night, to reduce the chance of sleepiness during the daytime. If there is a significant infection present and secretions are already thickened, it is recommended to discontinue antihistamines and use a mucous thinner/decongestant combination.    Oral Steroids  Oral steroids can be used with more severe infections. Often, they are the only medications that will reduce the symptoms of pressure and allow the nasal sinuses to drain. These are best taken on a full stomach and earlier in the day is better. They may give you some irritability, stomach upset, or hyperactivity. This can also interfere with sleep.     A person who has high blood pressure or diabetes should be very  careful and monitor their blood pressure or blood glucose while taking steroids. Steroids can have multiple side effects especially when taken long-term. Short-term doses are usually very well tolerated and effective in controlling the symptoms associated with sinus infections and severe allergies. The use of steroids for greater than approximately seven days requires a tapering down to discontinue them. You should not abruptly stop your steroid if you have been taking the same dose for greater than 7 days.    Body Aches/Pains/Fever  For patients who are not allergic to and are not on anticoagulants, you can alternate Tylenol every 4 hours and Motrin every 6 hours for fever above 100.4F and/or pain.  For patients who are allergic or intolerant to NSAIDS, have gastritis, gastric ulcers, or history of GI bleeds, are pregnant, or are on anticoagulant therapy, you can take Tylenol every 4 hours as needed for fever above 100.4F and/or pain.     Maintain adequate hydration - Rest and keep yourself/patient well hydrated. For adults, it is recommended to drink at least 8 glasses of water daily.  This may help thin secretions and soothe the respiratory mucosa.     Sore Throat  Perform warm, saltwater gargles (1/2 tsp salt to 1 cup warm water) to help reduce inflammation and throat discomfort. Chloraseptic sprays and throat lozenges will also help with your throat pain.    COUGH  A viral cough may linger for 3 to 4 weeks but should steadily improve over time. Coughing is the body's natural way to clear mucus and help get rid of bacteria and viruses. Therefore, cough suppressants are usually not recommended.      Use Mucinex (guaifenesin) up to 2400mg/day to help clear and break up/loosen mucus/congestion from the chest when you have a cold or flu.      Common cough suppressants include the ingredient dextromethorphan or DM, (such as Mucinex DM) available over the counter and can be used for cough to stop the tickle in the  back of your throat.      ? Honey may be beneficial, especially on nocturnal cough 1 to 2 teaspoons can be taken straight or diluted in tea, juice or other liquid.    The antioxidants in honey are an important contributor to its decongestant properties. Darker honey contains more antioxidants. Buckwheat and avocado honey are particularly good choices. If these honeys are not available in your area, choose the darkest honey you can find.    It is important to follow up for Re-evaluation if new or worsening symptoms develop or symptoms exceed the expected duration of common cold.     Worsening or persistent symptoms may indicate the development of complications or the need to consider a diagnosis other than the common cold requiring an antibiotic. (eg, acute bacterial sinusitis, pneumonia, pertussis).    Go to Emergency Department or call 911 if you develop new or worsening symptoms including but not limited to:  Trouble breathing.  New or worsening chest discomfort.  Feel confused or disoriented.  Vomiting and can't keep liquids down.  Develop signs of fluid loss, such as dark-colored urine and muscle cramps.    **You must understand that you have received Urgent Care treatment only and that you may be released before all your medical problems are known or treated. You, the patient, are responsible to arrange for follow-up care as instructed.     Teressa Dai

## 2025-06-26 NOTE — ED ADULT NURSE NOTE - HOW OFTEN DO YOU HAVE A DRINK CONTAINING ALCOHOL?
[Other: ______] : provided by MARIBEL [Interpreters_IDNumber] : [id number] [Interpreters_FullName] : Patricia Penn [[id number]] : Lao Never